# Patient Record
Sex: MALE | Race: WHITE | NOT HISPANIC OR LATINO | Employment: FULL TIME | URBAN - NONMETROPOLITAN AREA
[De-identification: names, ages, dates, MRNs, and addresses within clinical notes are randomized per-mention and may not be internally consistent; named-entity substitution may affect disease eponyms.]

---

## 2017-02-21 ENCOUNTER — OFFICE VISIT (OUTPATIENT)
Dept: GASTROENTEROLOGY | Facility: CLINIC | Age: 57
End: 2017-02-21

## 2017-02-21 VITALS
HEART RATE: 80 BPM | OXYGEN SATURATION: 92 % | DIASTOLIC BLOOD PRESSURE: 74 MMHG | WEIGHT: 294.6 LBS | BODY MASS INDEX: 49.08 KG/M2 | SYSTOLIC BLOOD PRESSURE: 118 MMHG | TEMPERATURE: 98 F | HEIGHT: 65 IN

## 2017-02-21 DIAGNOSIS — I10 ESSENTIAL HYPERTENSION: ICD-10-CM

## 2017-02-21 DIAGNOSIS — K63.5 COLON POLYPS: Primary | ICD-10-CM

## 2017-02-21 PROCEDURE — S0260 H&P FOR SURGERY: HCPCS | Performed by: NURSE PRACTITIONER

## 2017-02-21 RX ORDER — HYDROCHLOROTHIAZIDE 25 MG/1
TABLET ORAL
COMMUNITY
Start: 2017-01-06

## 2017-02-21 RX ORDER — LISINOPRIL 40 MG/1
TABLET ORAL
COMMUNITY
Start: 2017-02-16

## 2017-02-21 RX ORDER — CHLORAL HYDRATE 500 MG
1000 CAPSULE ORAL
COMMUNITY

## 2017-03-07 ENCOUNTER — ANESTHESIA EVENT (OUTPATIENT)
Dept: GASTROENTEROLOGY | Facility: HOSPITAL | Age: 57
End: 2017-03-07

## 2017-03-08 ENCOUNTER — ANESTHESIA (OUTPATIENT)
Dept: GASTROENTEROLOGY | Facility: HOSPITAL | Age: 57
End: 2017-03-08

## 2017-03-08 ENCOUNTER — HOSPITAL ENCOUNTER (OUTPATIENT)
Facility: HOSPITAL | Age: 57
Setting detail: HOSPITAL OUTPATIENT SURGERY
Discharge: HOME | End: 2017-03-08
Attending: INTERNAL MEDICINE | Admitting: INTERNAL MEDICINE

## 2017-03-08 VITALS
DIASTOLIC BLOOD PRESSURE: 72 MMHG | SYSTOLIC BLOOD PRESSURE: 110 MMHG | HEIGHT: 67 IN | BODY MASS INDEX: 44.57 KG/M2 | TEMPERATURE: 98.1 F | RESPIRATION RATE: 19 BRPM | WEIGHT: 284 LBS | HEART RATE: 82 BPM | OXYGEN SATURATION: 96 %

## 2017-03-08 PROCEDURE — 45378 DIAGNOSTIC COLONOSCOPY: CPT | Performed by: INTERNAL MEDICINE

## 2017-03-08 PROCEDURE — 25010000002 PROPOFOL 10 MG/ML EMULSION: Performed by: NURSE ANESTHETIST, CERTIFIED REGISTERED

## 2017-03-08 RX ORDER — SODIUM CHLORIDE 0.9 % (FLUSH) 0.9 %
1-10 SYRINGE (ML) INJECTION AS NEEDED
Status: DISCONTINUED | OUTPATIENT
Start: 2017-03-08 | End: 2017-03-08 | Stop reason: HOSPADM

## 2017-03-08 RX ORDER — SODIUM CHLORIDE 9 MG/ML
100 INJECTION, SOLUTION INTRAVENOUS CONTINUOUS
Status: DISCONTINUED | OUTPATIENT
Start: 2017-03-08 | End: 2017-03-08 | Stop reason: HOSPADM

## 2017-03-08 RX ORDER — PROPOFOL 10 MG/ML
VIAL (ML) INTRAVENOUS AS NEEDED
Status: DISCONTINUED | OUTPATIENT
Start: 2017-03-08 | End: 2017-03-08 | Stop reason: SURG

## 2017-03-08 RX ADMIN — PROPOFOL 290 MG: 10 INJECTION, EMULSION INTRAVENOUS at 08:52

## 2017-03-08 RX ADMIN — SODIUM CHLORIDE 100 ML/HR: 9 INJECTION, SOLUTION INTRAVENOUS at 08:10

## 2017-03-08 RX ADMIN — LIDOCAINE HYDROCHLORIDE 0.5 ML: 10 INJECTION, SOLUTION EPIDURAL; INFILTRATION; INTRACAUDAL; PERINEURAL at 08:09

## 2017-03-09 ENCOUNTER — TELEPHONE (OUTPATIENT)
Dept: GASTROENTEROLOGY | Facility: CLINIC | Age: 57
End: 2017-03-09

## 2017-06-30 RX ORDER — LISINOPRIL 40 MG/1
TABLET ORAL
COMMUNITY
Start: 2017-06-21 | End: 2017-07-17 | Stop reason: SDUPTHER

## 2017-06-30 RX ORDER — HYDROCHLOROTHIAZIDE 25 MG/1
TABLET ORAL
COMMUNITY
Start: 2017-06-21 | End: 2017-07-17 | Stop reason: SDUPTHER

## 2017-07-12 ENCOUNTER — HOSPITAL ENCOUNTER (OUTPATIENT)
Dept: CT IMAGING | Age: 57
Discharge: HOME | End: 2017-07-12
Payer: COMMERCIAL

## 2017-07-12 ENCOUNTER — OFFICE VISIT (OUTPATIENT)
Dept: INTERNAL MEDICINE | Age: 57
End: 2017-07-12
Payer: COMMERCIAL

## 2017-07-12 VITALS
TEMPERATURE: 98.5 F | HEIGHT: 67 IN | BODY MASS INDEX: 43.63 KG/M2 | WEIGHT: 278 LBS | OXYGEN SATURATION: 98 % | HEART RATE: 84 BPM | DIASTOLIC BLOOD PRESSURE: 78 MMHG | SYSTOLIC BLOOD PRESSURE: 132 MMHG

## 2017-07-12 DIAGNOSIS — K57.32 DIVERTICULITIS OF LARGE INTESTINE WITHOUT PERFORATION OR ABSCESS WITHOUT BLEEDING: Primary | ICD-10-CM

## 2017-07-12 DIAGNOSIS — R10.31 RIGHT LOWER QUADRANT ABDOMINAL PAIN: Primary | ICD-10-CM

## 2017-07-12 DIAGNOSIS — R10.31 RIGHT LOWER QUADRANT ABDOMINAL PAIN: ICD-10-CM

## 2017-07-12 DIAGNOSIS — R10.31 RLQ ABDOMINAL PAIN: ICD-10-CM

## 2017-07-12 DIAGNOSIS — K80.20 GALLSTONES: Primary | ICD-10-CM

## 2017-07-12 DIAGNOSIS — R10.31 RLQ ABDOMINAL PAIN: Primary | ICD-10-CM

## 2017-07-12 DIAGNOSIS — R10.9 ABDOMINAL CRAMPING: ICD-10-CM

## 2017-07-12 LAB
ALBUMIN SERPL-MCNC: 4.2 G/DL (ref 3.5–5.2)
ALP BLD-CCNC: 66 U/L (ref 40–130)
ALT SERPL-CCNC: 16 U/L (ref 5–41)
ANION GAP SERPL CALCULATED.3IONS-SCNC: 17 MMOL/L (ref 7–19)
AST SERPL-CCNC: 14 U/L (ref 5–40)
BILIRUB SERPL-MCNC: 0.9 MG/DL (ref 0.2–1.2)
BUN BLDV-MCNC: 14 MG/DL (ref 6–20)
C-REACTIVE PROTEIN: 5.5 MG/DL (ref 0–0.5)
CALCIUM SERPL-MCNC: 9.4 MG/DL (ref 8.6–10)
CHLORIDE BLD-SCNC: 94 MMOL/L (ref 98–111)
CO2: 28 MMOL/L (ref 22–29)
CREAT SERPL-MCNC: 0.8 MG/DL (ref 0.5–1.2)
GFR NON-AFRICAN AMERICAN: >60
GLUCOSE BLD-MCNC: 102 MG/DL (ref 74–109)
HCT VFR BLD CALC: 46.3 % (ref 42–52)
HEMOGLOBIN: 15.1 G/DL (ref 14–18)
LACTIC ACID: 1.3 MG/DL (ref 0.5–1.9)
MCH RBC QN AUTO: 27.3 PG (ref 27–31)
MCHC RBC AUTO-ENTMCNC: 32.6 G/DL (ref 33–37)
MCV RBC AUTO: 83.7 FL (ref 80–94)
PDW BLD-RTO: 13.8 % (ref 11.5–14.5)
PLATELET # BLD: 272 K/UL (ref 130–400)
PMV BLD AUTO: 9.4 FL (ref 9.4–12.4)
POTASSIUM SERPL-SCNC: 4.3 MMOL/L (ref 3.5–5)
RBC # BLD: 5.53 M/UL (ref 4.7–6.1)
SODIUM BLD-SCNC: 139 MMOL/L (ref 136–145)
TOTAL PROTEIN: 8 G/DL (ref 6.6–8.7)
WBC # BLD: 11.5 K/UL (ref 4.8–10.8)

## 2017-07-12 PROCEDURE — 74178 CT ABD&PLV WO CNTR FLWD CNTR: CPT

## 2017-07-12 PROCEDURE — 96372 THER/PROPH/DIAG INJ SC/IM: CPT | Performed by: NURSE PRACTITIONER

## 2017-07-12 PROCEDURE — 99214 OFFICE O/P EST MOD 30 MIN: CPT | Performed by: NURSE PRACTITIONER

## 2017-07-12 PROCEDURE — 6360000004 HC RX CONTRAST MEDICATION: Performed by: NURSE PRACTITIONER

## 2017-07-12 RX ORDER — CEFTRIAXONE 1 G/1
1 INJECTION, POWDER, FOR SOLUTION INTRAMUSCULAR; INTRAVENOUS ONCE
Status: COMPLETED | OUTPATIENT
Start: 2017-07-12 | End: 2017-07-12

## 2017-07-12 RX ORDER — METRONIDAZOLE 500 MG/1
500 TABLET ORAL 3 TIMES DAILY
Qty: 21 TABLET | Refills: 1 | Status: SHIPPED | OUTPATIENT
Start: 2017-07-12 | End: 2017-07-21 | Stop reason: ALTCHOICE

## 2017-07-12 RX ORDER — CHLORAL HYDRATE 500 MG
1000 CAPSULE ORAL
COMMUNITY

## 2017-07-12 RX ORDER — CEFDINIR 300 MG/1
300 CAPSULE ORAL 2 TIMES DAILY
Qty: 14 CAPSULE | Refills: 1 | Status: SHIPPED | OUTPATIENT
Start: 2017-07-12 | End: 2017-07-19

## 2017-07-12 RX ADMIN — IOVERSOL 75 ML: 741 INJECTION INTRA-ARTERIAL; INTRAVENOUS at 11:57

## 2017-07-12 RX ADMIN — CEFTRIAXONE 1 G: 1 INJECTION, POWDER, FOR SOLUTION INTRAMUSCULAR; INTRAVENOUS at 12:58

## 2017-07-12 ASSESSMENT — ENCOUNTER SYMPTOMS
CONSTIPATION: 0
WHEEZING: 0
BLOOD IN STOOL: 0
NAUSEA: 0
ABDOMINAL PAIN: 1
STRIDOR: 0
EYE DISCHARGE: 0
SHORTNESS OF BREATH: 0
CHOKING: 0
COLOR CHANGE: 0
TROUBLE SWALLOWING: 0
VOMITING: 0
DIARRHEA: 0
COUGH: 0
ABDOMINAL DISTENTION: 0
EYE ITCHING: 0
SORE THROAT: 0

## 2017-07-12 ASSESSMENT — PATIENT HEALTH QUESTIONNAIRE - PHQ9
1. LITTLE INTEREST OR PLEASURE IN DOING THINGS: 0
2. FEELING DOWN, DEPRESSED OR HOPELESS: 0
SUM OF ALL RESPONSES TO PHQ9 QUESTIONS 1 & 2: 0
SUM OF ALL RESPONSES TO PHQ QUESTIONS 1-9: 0

## 2017-07-17 RX ORDER — HYDROCHLOROTHIAZIDE 25 MG/1
25 TABLET ORAL DAILY
Qty: 30 TABLET | Refills: 5 | Status: SHIPPED | OUTPATIENT
Start: 2017-07-17 | End: 2018-07-23 | Stop reason: SDUPTHER

## 2017-07-17 RX ORDER — LISINOPRIL 40 MG/1
40 TABLET ORAL DAILY
Qty: 30 TABLET | Refills: 5 | Status: SHIPPED | OUTPATIENT
Start: 2017-07-17 | End: 2018-01-17 | Stop reason: SDUPTHER

## 2017-07-19 DIAGNOSIS — K57.32 DIVERTICULITIS OF LARGE INTESTINE WITHOUT PERFORATION OR ABSCESS WITHOUT BLEEDING: ICD-10-CM

## 2017-07-19 LAB
HCT VFR BLD CALC: 43.4 % (ref 42–52)
HEMOGLOBIN: 13.9 G/DL (ref 14–18)
MCH RBC QN AUTO: 26.7 PG (ref 27–31)
MCHC RBC AUTO-ENTMCNC: 32 G/DL (ref 33–37)
MCV RBC AUTO: 83.5 FL (ref 80–94)
PDW BLD-RTO: 13.7 % (ref 11.5–14.5)
PLATELET # BLD: 356 K/UL (ref 130–400)
PMV BLD AUTO: 9.5 FL (ref 9.4–12.4)
RBC # BLD: 5.2 M/UL (ref 4.7–6.1)
WBC # BLD: 10.6 K/UL (ref 4.8–10.8)

## 2017-07-21 ENCOUNTER — OFFICE VISIT (OUTPATIENT)
Dept: INTERNAL MEDICINE | Age: 57
End: 2017-07-21
Payer: COMMERCIAL

## 2017-07-21 VITALS
HEART RATE: 68 BPM | WEIGHT: 278 LBS | HEIGHT: 65 IN | DIASTOLIC BLOOD PRESSURE: 76 MMHG | BODY MASS INDEX: 46.32 KG/M2 | OXYGEN SATURATION: 96 % | SYSTOLIC BLOOD PRESSURE: 124 MMHG | TEMPERATURE: 97.7 F

## 2017-07-21 DIAGNOSIS — E78.2 MIXED HYPERLIPIDEMIA: Primary | ICD-10-CM

## 2017-07-21 DIAGNOSIS — Z00.00 HEALTHCARE MAINTENANCE: ICD-10-CM

## 2017-07-21 PROCEDURE — 99213 OFFICE O/P EST LOW 20 MIN: CPT | Performed by: NURSE PRACTITIONER

## 2017-07-21 ASSESSMENT — ENCOUNTER SYMPTOMS
STRIDOR: 0
EYE ITCHING: 0
CHOKING: 0
SHORTNESS OF BREATH: 0
CONSTIPATION: 0
TROUBLE SWALLOWING: 0
WHEEZING: 0
ABDOMINAL PAIN: 0
ABDOMINAL DISTENTION: 0
BLOOD IN STOOL: 0
COUGH: 0
COLOR CHANGE: 0
EYE DISCHARGE: 0
VOMITING: 0
SORE THROAT: 0
DIARRHEA: 0
NAUSEA: 0

## 2017-07-31 RX ORDER — TRIAMCINOLONE ACETONIDE 1 MG/G
CREAM TOPICAL
Qty: 1 TUBE | Refills: 5 | Status: SHIPPED | OUTPATIENT
Start: 2017-07-31 | End: 2019-03-04

## 2017-08-04 ENCOUNTER — TELEPHONE (OUTPATIENT)
Dept: INTERNAL MEDICINE | Age: 57
End: 2017-08-04

## 2017-08-04 RX ORDER — CEFDINIR 300 MG/1
300 CAPSULE ORAL 2 TIMES DAILY
Qty: 14 CAPSULE | Refills: 0 | Status: SHIPPED | OUTPATIENT
Start: 2017-08-04 | End: 2017-08-11

## 2018-01-17 ENCOUNTER — TELEPHONE (OUTPATIENT)
Dept: INTERNAL MEDICINE | Age: 58
End: 2018-01-17

## 2018-01-17 RX ORDER — LISINOPRIL 40 MG/1
40 TABLET ORAL DAILY
Qty: 30 TABLET | Refills: 5 | Status: SHIPPED | OUTPATIENT
Start: 2018-01-17 | End: 2018-07-23 | Stop reason: SDUPTHER

## 2018-02-16 ENCOUNTER — OFFICE VISIT (OUTPATIENT)
Dept: INTERNAL MEDICINE | Age: 58
End: 2018-02-16
Payer: COMMERCIAL

## 2018-02-16 VITALS
HEART RATE: 75 BPM | DIASTOLIC BLOOD PRESSURE: 82 MMHG | BODY MASS INDEX: 45.83 KG/M2 | SYSTOLIC BLOOD PRESSURE: 132 MMHG | OXYGEN SATURATION: 94 % | WEIGHT: 292 LBS | HEIGHT: 67 IN

## 2018-02-16 DIAGNOSIS — M25.50 ARTHRALGIA, UNSPECIFIED JOINT: Primary | ICD-10-CM

## 2018-02-16 DIAGNOSIS — I10 ESSENTIAL HYPERTENSION: ICD-10-CM

## 2018-02-16 DIAGNOSIS — M25.50 ARTHRALGIA, UNSPECIFIED JOINT: ICD-10-CM

## 2018-02-16 LAB
ALBUMIN SERPL-MCNC: 4 G/DL (ref 3.5–5.2)
ALP BLD-CCNC: 58 U/L (ref 40–130)
ALT SERPL-CCNC: 15 U/L (ref 5–41)
ANION GAP SERPL CALCULATED.3IONS-SCNC: 13 MMOL/L (ref 7–19)
AST SERPL-CCNC: 14 U/L (ref 5–40)
BILIRUB SERPL-MCNC: 0.6 MG/DL (ref 0.2–1.2)
BUN BLDV-MCNC: 18 MG/DL (ref 6–20)
C-REACTIVE PROTEIN: 0.99 MG/DL (ref 0–0.5)
CALCIUM SERPL-MCNC: 9.1 MG/DL (ref 8.6–10)
CHLORIDE BLD-SCNC: 97 MMOL/L (ref 98–111)
CO2: 29 MMOL/L (ref 22–29)
CREAT SERPL-MCNC: 0.9 MG/DL (ref 0.5–1.2)
GFR NON-AFRICAN AMERICAN: >60
GLUCOSE BLD-MCNC: 105 MG/DL (ref 74–109)
HCT VFR BLD CALC: 43.8 % (ref 42–52)
HEMOGLOBIN: 14.2 G/DL (ref 14–18)
MCH RBC QN AUTO: 27 PG (ref 27–31)
MCHC RBC AUTO-ENTMCNC: 32.4 G/DL (ref 33–37)
MCV RBC AUTO: 83.4 FL (ref 80–94)
PDW BLD-RTO: 13.4 % (ref 11.5–14.5)
PLATELET # BLD: 251 K/UL (ref 130–400)
PMV BLD AUTO: 9.2 FL (ref 9.4–12.4)
POTASSIUM SERPL-SCNC: 4.3 MMOL/L (ref 3.5–5)
RBC # BLD: 5.25 M/UL (ref 4.7–6.1)
RHEUMATOID FACTOR: <10 IU/ML
SEDIMENTATION RATE, ERYTHROCYTE: 8 MM/HR (ref 0–15)
SODIUM BLD-SCNC: 139 MMOL/L (ref 136–145)
TOTAL PROTEIN: 7.2 G/DL (ref 6.6–8.7)
URIC ACID, SERUM: 6.9 MG/DL (ref 3.4–7)
WBC # BLD: 5.4 K/UL (ref 4.8–10.8)

## 2018-02-16 PROCEDURE — 1036F TOBACCO NON-USER: CPT | Performed by: NURSE PRACTITIONER

## 2018-02-16 PROCEDURE — 3017F COLORECTAL CA SCREEN DOC REV: CPT | Performed by: NURSE PRACTITIONER

## 2018-02-16 PROCEDURE — G8484 FLU IMMUNIZE NO ADMIN: HCPCS | Performed by: NURSE PRACTITIONER

## 2018-02-16 PROCEDURE — 99213 OFFICE O/P EST LOW 20 MIN: CPT | Performed by: NURSE PRACTITIONER

## 2018-02-16 PROCEDURE — G8417 CALC BMI ABV UP PARAM F/U: HCPCS | Performed by: NURSE PRACTITIONER

## 2018-02-16 PROCEDURE — G8427 DOCREV CUR MEDS BY ELIG CLIN: HCPCS | Performed by: NURSE PRACTITIONER

## 2018-02-16 ASSESSMENT — ENCOUNTER SYMPTOMS
CHOKING: 0
ABDOMINAL PAIN: 0
EYE ITCHING: 0
EYE DISCHARGE: 0
COUGH: 0
SORE THROAT: 0
ABDOMINAL DISTENTION: 0
WHEEZING: 0
COLOR CHANGE: 0
BLOOD IN STOOL: 0
SHORTNESS OF BREATH: 0
CONSTIPATION: 0
VOMITING: 0
NAUSEA: 0
DIARRHEA: 0
STRIDOR: 0
TROUBLE SWALLOWING: 0

## 2018-02-16 NOTE — PATIENT INSTRUCTIONS
1  Arthralgia; We will get labs  Try a shot glass of black cherry juice daily  2.   HTN Stop the [facility] for now   We will monitor for elevation of bp and edema    I will call you first of the week with labs

## 2018-02-16 NOTE — PROGRESS NOTES
CAPS Take 1,000 mg by mouth      Sod Picosulfate-Mag Ox-Cit Acd 10-3.5-12 MG-GM-GM PACK Take as directed      CPAP Machine MISC by Does not apply route CPAP supplies 1 each 0     No current facility-administered medications for this visit. Allergies   Allergen Reactions    Aspirin Swelling    Nsaids Hives and Itching       Health Maintenance   Topic Date Due    Hepatitis C screen  1960    HIV screen  08/13/1975    DTaP/Tdap/Td vaccine (1 - Tdap) 08/13/1979    Lipid screen  08/13/2000    Diabetes screen  08/13/2000    Colon cancer screen colonoscopy  08/13/2010    Flu vaccine (1) 09/01/2017    Potassium monitoring  07/12/2018    Creatinine monitoring  07/12/2018       Subjective:      Review of Systems   Constitutional: Negative for fatigue, fever and unexpected weight change. HENT: Negative for ear discharge, ear pain, mouth sores, sore throat and trouble swallowing. Eyes: Negative for discharge, itching and visual disturbance. Respiratory: Negative for cough, choking, shortness of breath, wheezing and stridor. Cardiovascular: Negative for chest pain, palpitations and leg swelling. Gastrointestinal: Negative for abdominal distention, abdominal pain, blood in stool, constipation, diarrhea, nausea and vomiting. Endocrine: Negative for cold intolerance, polydipsia and polyuria. Genitourinary: Negative for difficulty urinating, dysuria, frequency and urgency. Musculoskeletal: Positive for arthralgias. Negative for gait problem. Skin: Negative for color change and rash. Allergic/Immunologic: Negative for food allergies and immunocompromised state. Neurological: Negative for dizziness, tremors, syncope, speech difficulty, weakness and headaches. Hematological: Negative for adenopathy. Does not bruise/bleed easily. Psychiatric/Behavioral: Negative for confusion and hallucinations.        Objective:     Physical Exam   Constitutional: He is oriented to person, place, and

## 2018-02-20 RX ORDER — CELECOXIB 100 MG/1
100 CAPSULE ORAL DAILY
Qty: 30 CAPSULE | Refills: 0 | Status: SHIPPED | OUTPATIENT
Start: 2018-02-20 | End: 2018-03-05 | Stop reason: DRUGHIGH

## 2018-03-05 RX ORDER — CELECOXIB 200 MG/1
200 CAPSULE ORAL DAILY
Qty: 30 CAPSULE | Refills: 1 | Status: SHIPPED | OUTPATIENT
Start: 2018-03-05 | End: 2018-05-07 | Stop reason: SDUPTHER

## 2018-03-05 RX ORDER — CELECOXIB 200 MG/1
200 CAPSULE ORAL DAILY
COMMUNITY
End: 2018-03-05 | Stop reason: SDUPTHER

## 2018-04-17 ENCOUNTER — OFFICE VISIT (OUTPATIENT)
Dept: INTERNAL MEDICINE | Age: 58
End: 2018-04-17
Payer: COMMERCIAL

## 2018-04-17 VITALS
TEMPERATURE: 99.4 F | HEART RATE: 85 BPM | BODY MASS INDEX: 45.04 KG/M2 | DIASTOLIC BLOOD PRESSURE: 80 MMHG | SYSTOLIC BLOOD PRESSURE: 110 MMHG | WEIGHT: 287 LBS | OXYGEN SATURATION: 94 % | RESPIRATION RATE: 16 BRPM | HEIGHT: 67 IN

## 2018-04-17 DIAGNOSIS — M54.50 ACUTE RIGHT-SIDED LOW BACK PAIN WITHOUT SCIATICA: ICD-10-CM

## 2018-04-17 DIAGNOSIS — M15.9 PRIMARY OSTEOARTHRITIS INVOLVING MULTIPLE JOINTS: ICD-10-CM

## 2018-04-17 DIAGNOSIS — J20.9 ACUTE BRONCHITIS, UNSPECIFIED ORGANISM: Primary | ICD-10-CM

## 2018-04-17 DIAGNOSIS — J01.40 ACUTE NON-RECURRENT PANSINUSITIS: ICD-10-CM

## 2018-04-17 PROCEDURE — 3017F COLORECTAL CA SCREEN DOC REV: CPT | Performed by: NURSE PRACTITIONER

## 2018-04-17 PROCEDURE — G8417 CALC BMI ABV UP PARAM F/U: HCPCS | Performed by: NURSE PRACTITIONER

## 2018-04-17 PROCEDURE — 96372 THER/PROPH/DIAG INJ SC/IM: CPT | Performed by: NURSE PRACTITIONER

## 2018-04-17 PROCEDURE — 1036F TOBACCO NON-USER: CPT | Performed by: NURSE PRACTITIONER

## 2018-04-17 PROCEDURE — G8427 DOCREV CUR MEDS BY ELIG CLIN: HCPCS | Performed by: NURSE PRACTITIONER

## 2018-04-17 PROCEDURE — 99214 OFFICE O/P EST MOD 30 MIN: CPT | Performed by: NURSE PRACTITIONER

## 2018-04-17 RX ORDER — METHYLPREDNISOLONE ACETATE 80 MG/ML
80 INJECTION, SUSPENSION INTRA-ARTICULAR; INTRALESIONAL; INTRAMUSCULAR; SOFT TISSUE ONCE
Status: COMPLETED | OUTPATIENT
Start: 2018-04-17 | End: 2018-04-17

## 2018-04-17 RX ORDER — CEFDINIR 300 MG/1
300 CAPSULE ORAL 2 TIMES DAILY
Qty: 14 CAPSULE | Refills: 0 | Status: SHIPPED | OUTPATIENT
Start: 2018-04-17 | End: 2018-04-24

## 2018-04-17 RX ORDER — CYCLOBENZAPRINE HCL 10 MG
10 TABLET ORAL 3 TIMES DAILY PRN
Qty: 30 TABLET | Refills: 1 | Status: SHIPPED | OUTPATIENT
Start: 2018-04-17 | End: 2018-04-27

## 2018-04-17 RX ADMIN — METHYLPREDNISOLONE ACETATE 80 MG: 80 INJECTION, SUSPENSION INTRA-ARTICULAR; INTRALESIONAL; INTRAMUSCULAR; SOFT TISSUE at 13:46

## 2018-04-17 ASSESSMENT — ENCOUNTER SYMPTOMS
NAUSEA: 0
SINUS PRESSURE: 1
BACK PAIN: 1
ABDOMINAL DISTENTION: 0
VOMITING: 0
BLOOD IN STOOL: 0
COLOR CHANGE: 0
SHORTNESS OF BREATH: 0
STRIDOR: 0
ABDOMINAL PAIN: 0
TROUBLE SWALLOWING: 0
COUGH: 1
EYE ITCHING: 0
CHOKING: 0
CONSTIPATION: 0
SORE THROAT: 1
DIARRHEA: 0
EYE DISCHARGE: 0
WHEEZING: 0

## 2018-04-23 ENCOUNTER — TELEPHONE (OUTPATIENT)
Dept: INTERNAL MEDICINE | Age: 58
End: 2018-04-23

## 2018-05-07 RX ORDER — CELECOXIB 200 MG/1
200 CAPSULE ORAL DAILY
Qty: 30 CAPSULE | Refills: 3 | Status: SHIPPED | OUTPATIENT
Start: 2018-05-07 | End: 2018-06-20

## 2018-06-05 ENCOUNTER — TELEPHONE (OUTPATIENT)
Dept: INTERNAL MEDICINE | Age: 58
End: 2018-06-05

## 2018-06-05 DIAGNOSIS — M79.605 PAIN OF LEFT LOWER EXTREMITY: Primary | ICD-10-CM

## 2018-06-20 ENCOUNTER — HOSPITAL ENCOUNTER (OUTPATIENT)
Dept: NON INVASIVE DIAGNOSTICS | Age: 58
Discharge: HOME | End: 2018-06-20
Payer: COMMERCIAL

## 2018-06-20 ENCOUNTER — OFFICE VISIT (OUTPATIENT)
Dept: INTERNAL MEDICINE | Age: 58
End: 2018-06-20
Payer: COMMERCIAL

## 2018-06-20 VITALS
SYSTOLIC BLOOD PRESSURE: 110 MMHG | RESPIRATION RATE: 16 BRPM | HEART RATE: 87 BPM | HEIGHT: 67 IN | DIASTOLIC BLOOD PRESSURE: 68 MMHG | OXYGEN SATURATION: 94 % | BODY MASS INDEX: 45.99 KG/M2 | WEIGHT: 293 LBS

## 2018-06-20 DIAGNOSIS — M79.605 PAIN OF LEFT LOWER EXTREMITY: ICD-10-CM

## 2018-06-20 DIAGNOSIS — M25.561 CHRONIC PAIN OF RIGHT KNEE: Primary | ICD-10-CM

## 2018-06-20 DIAGNOSIS — M15.9 PRIMARY OSTEOARTHRITIS INVOLVING MULTIPLE JOINTS: ICD-10-CM

## 2018-06-20 DIAGNOSIS — G89.29 CHRONIC PAIN OF RIGHT KNEE: Primary | ICD-10-CM

## 2018-06-20 PROCEDURE — G8427 DOCREV CUR MEDS BY ELIG CLIN: HCPCS | Performed by: NURSE PRACTITIONER

## 2018-06-20 PROCEDURE — 99213 OFFICE O/P EST LOW 20 MIN: CPT | Performed by: NURSE PRACTITIONER

## 2018-06-20 PROCEDURE — G8417 CALC BMI ABV UP PARAM F/U: HCPCS | Performed by: NURSE PRACTITIONER

## 2018-06-20 PROCEDURE — 93923 UPR/LXTR ART STDY 3+ LVLS: CPT

## 2018-06-20 PROCEDURE — 3017F COLORECTAL CA SCREEN DOC REV: CPT | Performed by: NURSE PRACTITIONER

## 2018-06-20 PROCEDURE — 1036F TOBACCO NON-USER: CPT | Performed by: NURSE PRACTITIONER

## 2018-06-20 ASSESSMENT — ENCOUNTER SYMPTOMS
COLOR CHANGE: 0
STRIDOR: 0
CONSTIPATION: 0
VOMITING: 0
NAUSEA: 0
EYE DISCHARGE: 0
EYE ITCHING: 0
TROUBLE SWALLOWING: 0
SHORTNESS OF BREATH: 0
WHEEZING: 0
DIARRHEA: 0
ABDOMINAL DISTENTION: 0
BLOOD IN STOOL: 0
ABDOMINAL PAIN: 0
CHOKING: 0
SORE THROAT: 0
COUGH: 0

## 2018-07-12 ENCOUNTER — TELEPHONE (OUTPATIENT)
Dept: VASCULAR SURGERY | Age: 58
End: 2018-07-12

## 2018-07-12 NOTE — TELEPHONE ENCOUNTER
I called to r/s a cancelled appt and the pt said it didn't need to be scheduled. That the results showed nothing wrong.  Francois Chance

## 2018-07-23 RX ORDER — LISINOPRIL 40 MG/1
40 TABLET ORAL DAILY
Qty: 30 TABLET | Refills: 5 | Status: SHIPPED | OUTPATIENT
Start: 2018-07-23 | End: 2019-01-21 | Stop reason: SDUPTHER

## 2018-07-23 RX ORDER — HYDROCHLOROTHIAZIDE 25 MG/1
25 TABLET ORAL DAILY
Qty: 30 TABLET | Refills: 5 | Status: SHIPPED | OUTPATIENT
Start: 2018-07-23 | End: 2019-07-09 | Stop reason: SDUPTHER

## 2018-10-05 ENCOUNTER — NURSE ONLY (OUTPATIENT)
Dept: INTERNAL MEDICINE | Age: 58
End: 2018-10-05
Payer: COMMERCIAL

## 2018-10-05 DIAGNOSIS — Z23 NEED FOR INFLUENZA VACCINATION: Primary | ICD-10-CM

## 2018-10-05 PROCEDURE — 90686 IIV4 VACC NO PRSV 0.5 ML IM: CPT | Performed by: NURSE PRACTITIONER

## 2018-10-05 PROCEDURE — 90471 IMMUNIZATION ADMIN: CPT | Performed by: NURSE PRACTITIONER

## 2019-01-21 RX ORDER — LISINOPRIL 40 MG/1
TABLET ORAL
Qty: 30 TABLET | Refills: 5 | Status: SHIPPED | OUTPATIENT
Start: 2019-01-21 | End: 2019-07-09 | Stop reason: SDUPTHER

## 2019-03-04 ENCOUNTER — OFFICE VISIT (OUTPATIENT)
Dept: INTERNAL MEDICINE | Age: 59
End: 2019-03-04
Payer: COMMERCIAL

## 2019-03-04 VITALS
HEIGHT: 66 IN | RESPIRATION RATE: 20 BRPM | WEIGHT: 304 LBS | SYSTOLIC BLOOD PRESSURE: 104 MMHG | DIASTOLIC BLOOD PRESSURE: 66 MMHG | OXYGEN SATURATION: 95 % | BODY MASS INDEX: 48.86 KG/M2 | HEART RATE: 93 BPM

## 2019-03-04 DIAGNOSIS — R30.0 DYSURIA: ICD-10-CM

## 2019-03-04 DIAGNOSIS — R50.9 FEVER, UNSPECIFIED FEVER CAUSE: Primary | ICD-10-CM

## 2019-03-04 DIAGNOSIS — N41.0 ACUTE PROSTATITIS: Primary | ICD-10-CM

## 2019-03-04 LAB
ALBUMIN SERPL-MCNC: 4 G/DL (ref 3.5–5.2)
ALP BLD-CCNC: 64 U/L (ref 40–130)
ALT SERPL-CCNC: 20 U/L (ref 5–41)
ANION GAP SERPL CALCULATED.3IONS-SCNC: 18 MMOL/L (ref 7–19)
AST SERPL-CCNC: 17 U/L (ref 5–40)
BACTERIA: ABNORMAL /HPF
BASOPHILS ABSOLUTE: 0.1 K/UL (ref 0–0.2)
BASOPHILS RELATIVE PERCENT: 0.3 % (ref 0–1)
BILIRUB SERPL-MCNC: 0.8 MG/DL (ref 0.2–1.2)
BILIRUBIN URINE: NEGATIVE
BLOOD, URINE: ABNORMAL
BUN BLDV-MCNC: 19 MG/DL (ref 6–20)
CALCIUM SERPL-MCNC: 9 MG/DL (ref 8.6–10)
CHLORIDE BLD-SCNC: 95 MMOL/L (ref 98–111)
CLARITY: ABNORMAL
CO2: 27 MMOL/L (ref 22–29)
COLOR: ABNORMAL
CREAT SERPL-MCNC: 1.2 MG/DL (ref 0.5–1.2)
EOSINOPHILS ABSOLUTE: 0.1 K/UL (ref 0–0.6)
EOSINOPHILS RELATIVE PERCENT: 0.4 % (ref 0–5)
EPITHELIAL CELLS, UA: ABNORMAL /HPF
GFR NON-AFRICAN AMERICAN: >60
GLUCOSE BLD-MCNC: 100 MG/DL (ref 74–109)
GLUCOSE URINE: NEGATIVE MG/DL
HCT VFR BLD CALC: 42.7 % (ref 42–52)
HEMOGLOBIN: 13.5 G/DL (ref 14–18)
KETONES, URINE: 40 MG/DL
LEUKOCYTE ESTERASE, URINE: ABNORMAL
LYMPHOCYTES ABSOLUTE: 1.2 K/UL (ref 1.1–4.5)
LYMPHOCYTES RELATIVE PERCENT: 6.8 % (ref 20–40)
MCH RBC QN AUTO: 26.6 PG (ref 27–31)
MCHC RBC AUTO-ENTMCNC: 31.6 G/DL (ref 33–37)
MCV RBC AUTO: 84.1 FL (ref 80–94)
MONOCYTES ABSOLUTE: 1.3 K/UL (ref 0–0.9)
MONOCYTES RELATIVE PERCENT: 7.3 % (ref 0–10)
NEUTROPHILS ABSOLUTE: 15.3 K/UL (ref 1.5–7.5)
NEUTROPHILS RELATIVE PERCENT: 84.5 % (ref 50–65)
NITRITE, URINE: NEGATIVE
PDW BLD-RTO: 13.8 % (ref 11.5–14.5)
PH UA: 5.5 (ref 5–8)
PLATELET # BLD: 217 K/UL (ref 130–400)
PMV BLD AUTO: 9.6 FL (ref 9.4–12.4)
POTASSIUM SERPL-SCNC: 3.8 MMOL/L (ref 3.5–5)
PROTEIN UA: ABNORMAL MG/DL
RBC # BLD: 5.08 M/UL (ref 4.7–6.1)
RBC UA: ABNORMAL /HPF (ref 0–2)
SODIUM BLD-SCNC: 140 MMOL/L (ref 136–145)
SPECIFIC GRAVITY UA: 1.02 (ref 1–1.03)
TOTAL PROTEIN: 7.4 G/DL (ref 6.6–8.7)
URINE REFLEX TO CULTURE: YES
UROBILINOGEN, URINE: 0.2 E.U./DL
WBC # BLD: 18 K/UL (ref 4.8–10.8)
WBC UA: ABNORMAL /HPF (ref 0–5)

## 2019-03-04 PROCEDURE — G8427 DOCREV CUR MEDS BY ELIG CLIN: HCPCS | Performed by: NURSE PRACTITIONER

## 2019-03-04 PROCEDURE — 1036F TOBACCO NON-USER: CPT | Performed by: NURSE PRACTITIONER

## 2019-03-04 PROCEDURE — G8417 CALC BMI ABV UP PARAM F/U: HCPCS | Performed by: NURSE PRACTITIONER

## 2019-03-04 PROCEDURE — 3017F COLORECTAL CA SCREEN DOC REV: CPT | Performed by: NURSE PRACTITIONER

## 2019-03-04 PROCEDURE — 99214 OFFICE O/P EST MOD 30 MIN: CPT | Performed by: NURSE PRACTITIONER

## 2019-03-04 PROCEDURE — G8482 FLU IMMUNIZE ORDER/ADMIN: HCPCS | Performed by: NURSE PRACTITIONER

## 2019-03-04 RX ORDER — CIPROFLOXACIN 500 MG/1
500 TABLET, FILM COATED ORAL 2 TIMES DAILY
Qty: 28 TABLET | Refills: 0 | Status: SHIPPED | OUTPATIENT
Start: 2019-03-04 | End: 2019-03-18

## 2019-03-04 ASSESSMENT — ENCOUNTER SYMPTOMS
COLOR CHANGE: 0
SORE THROAT: 0
VOMITING: 0
SHORTNESS OF BREATH: 0
CHOKING: 0
WHEEZING: 0
ABDOMINAL PAIN: 0
DIARRHEA: 0
STRIDOR: 0
ABDOMINAL DISTENTION: 0
NAUSEA: 0
BLOOD IN STOOL: 0
EYE DISCHARGE: 0
TROUBLE SWALLOWING: 0
COUGH: 0
CONSTIPATION: 0
EYE ITCHING: 0

## 2019-03-04 ASSESSMENT — PATIENT HEALTH QUESTIONNAIRE - PHQ9
SUM OF ALL RESPONSES TO PHQ QUESTIONS 1-9: 0
2. FEELING DOWN, DEPRESSED OR HOPELESS: 0
SUM OF ALL RESPONSES TO PHQ QUESTIONS 1-9: 0
SUM OF ALL RESPONSES TO PHQ9 QUESTIONS 1 & 2: 0
1. LITTLE INTEREST OR PLEASURE IN DOING THINGS: 0

## 2019-03-06 LAB — URINE CULTURE, ROUTINE: NORMAL

## 2019-06-12 ENCOUNTER — OFFICE VISIT (OUTPATIENT)
Dept: INTERNAL MEDICINE | Age: 59
End: 2019-06-12
Payer: COMMERCIAL

## 2019-06-12 ENCOUNTER — HOSPITAL ENCOUNTER (OUTPATIENT)
Dept: GENERAL RADIOLOGY | Age: 59
Discharge: HOME | End: 2019-06-12
Payer: COMMERCIAL

## 2019-06-12 VITALS
BODY MASS INDEX: 47.73 KG/M2 | OXYGEN SATURATION: 94 % | DIASTOLIC BLOOD PRESSURE: 68 MMHG | WEIGHT: 297 LBS | TEMPERATURE: 100.1 F | RESPIRATION RATE: 18 BRPM | HEART RATE: 95 BPM | SYSTOLIC BLOOD PRESSURE: 118 MMHG | HEIGHT: 66 IN

## 2019-06-12 DIAGNOSIS — R79.89 ELEVATED LFTS: ICD-10-CM

## 2019-06-12 DIAGNOSIS — R31.21 ASYMPTOMATIC MICROSCOPIC HEMATURIA: ICD-10-CM

## 2019-06-12 DIAGNOSIS — R50.9 FEVER, UNSPECIFIED FEVER CAUSE: ICD-10-CM

## 2019-06-12 DIAGNOSIS — R50.9 FEVER, UNSPECIFIED FEVER CAUSE: Primary | ICD-10-CM

## 2019-06-12 LAB
ALBUMIN SERPL-MCNC: 4.3 G/DL (ref 3.5–5.2)
ALP BLD-CCNC: 63 U/L (ref 40–130)
ALT SERPL-CCNC: 48 U/L (ref 5–41)
ANION GAP SERPL CALCULATED.3IONS-SCNC: 18 MMOL/L (ref 7–19)
AST SERPL-CCNC: 45 U/L (ref 5–40)
BACTERIA: NEGATIVE /HPF
BASOPHILS ABSOLUTE: 0 K/UL (ref 0–0.2)
BASOPHILS RELATIVE PERCENT: 0.3 % (ref 0–1)
BILIRUB SERPL-MCNC: 0.8 MG/DL (ref 0.2–1.2)
BILIRUBIN URINE: ABNORMAL
BLOOD, URINE: ABNORMAL
BUN BLDV-MCNC: 18 MG/DL (ref 6–20)
CALCIUM SERPL-MCNC: 9.2 MG/DL (ref 8.6–10)
CHLORIDE BLD-SCNC: 91 MMOL/L (ref 98–111)
CLARITY: CLEAR
CO2: 26 MMOL/L (ref 22–29)
COLOR: ABNORMAL
CREAT SERPL-MCNC: 1 MG/DL (ref 0.5–1.2)
EOSINOPHILS ABSOLUTE: 0 K/UL (ref 0–0.6)
EOSINOPHILS RELATIVE PERCENT: 0 % (ref 0–5)
EPITHELIAL CELLS, UA: 2 /HPF (ref 0–5)
GFR NON-AFRICAN AMERICAN: >60
GLUCOSE BLD-MCNC: 100 MG/DL (ref 74–109)
GLUCOSE URINE: NEGATIVE MG/DL
HCT VFR BLD CALC: 44.9 % (ref 42–52)
HEMOGLOBIN: 14.7 G/DL (ref 14–18)
HYALINE CASTS: 3 /HPF (ref 0–8)
KETONES, URINE: 40 MG/DL
LEUKOCYTE ESTERASE, URINE: NEGATIVE
LYMPHOCYTES ABSOLUTE: 0.5 K/UL (ref 1.1–4.5)
LYMPHOCYTES RELATIVE PERCENT: 7.7 % (ref 20–40)
MCH RBC QN AUTO: 27.1 PG (ref 27–31)
MCHC RBC AUTO-ENTMCNC: 32.7 G/DL (ref 33–37)
MCV RBC AUTO: 82.8 FL (ref 80–94)
MONOCYTES ABSOLUTE: 0.3 K/UL (ref 0–0.9)
MONOCYTES RELATIVE PERCENT: 5.7 % (ref 0–10)
NEUTROPHILS ABSOLUTE: 5.1 K/UL (ref 1.5–7.5)
NEUTROPHILS RELATIVE PERCENT: 85.8 % (ref 50–65)
NITRITE, URINE: NEGATIVE
PDW BLD-RTO: 14.2 % (ref 11.5–14.5)
PH UA: 6 (ref 5–8)
PLATELET # BLD: 186 K/UL (ref 130–400)
PMV BLD AUTO: 9.7 FL (ref 9.4–12.4)
POTASSIUM SERPL-SCNC: 3.9 MMOL/L (ref 3.5–5)
PROSTATE SPECIFIC ANTIGEN: 1.05 NG/ML (ref 0–4)
PROTEIN UA: ABNORMAL MG/DL
RBC # BLD: 5.42 M/UL (ref 4.7–6.1)
RBC UA: 5 /HPF (ref 0–4)
SODIUM BLD-SCNC: 135 MMOL/L (ref 136–145)
SPECIFIC GRAVITY UA: 1.02 (ref 1–1.03)
TOTAL PROTEIN: 7.6 G/DL (ref 6.6–8.7)
URINE REFLEX TO CULTURE: ABNORMAL
UROBILINOGEN, URINE: 1 E.U./DL
WBC # BLD: 6 K/UL (ref 4.8–10.8)
WBC UA: 2 /HPF (ref 0–5)

## 2019-06-12 PROCEDURE — 71046 X-RAY EXAM CHEST 2 VIEWS: CPT

## 2019-06-12 PROCEDURE — 99358 PROLONG SERVICE W/O CONTACT: CPT | Performed by: NURSE PRACTITIONER

## 2019-06-12 PROCEDURE — 1036F TOBACCO NON-USER: CPT | Performed by: NURSE PRACTITIONER

## 2019-06-12 PROCEDURE — 3017F COLORECTAL CA SCREEN DOC REV: CPT | Performed by: NURSE PRACTITIONER

## 2019-06-12 PROCEDURE — G8417 CALC BMI ABV UP PARAM F/U: HCPCS | Performed by: NURSE PRACTITIONER

## 2019-06-12 PROCEDURE — 96374 THER/PROPH/DIAG INJ IV PUSH: CPT | Performed by: NURSE PRACTITIONER

## 2019-06-12 PROCEDURE — 99214 OFFICE O/P EST MOD 30 MIN: CPT | Performed by: NURSE PRACTITIONER

## 2019-06-12 PROCEDURE — 70210 X-RAY EXAM OF SINUSES: CPT

## 2019-06-12 PROCEDURE — G8427 DOCREV CUR MEDS BY ELIG CLIN: HCPCS | Performed by: NURSE PRACTITIONER

## 2019-06-12 RX ORDER — CEFDINIR 300 MG/1
300 CAPSULE ORAL 2 TIMES DAILY
Qty: 14 CAPSULE | Refills: 0 | Status: SHIPPED | OUTPATIENT
Start: 2019-06-12 | End: 2019-06-19

## 2019-06-12 RX ORDER — PROMETHAZINE HYDROCHLORIDE 25 MG/1
25 TABLET ORAL EVERY 6 HOURS PRN
Qty: 28 TABLET | Refills: 2 | Status: SHIPPED | OUTPATIENT
Start: 2019-06-12 | End: 2019-06-19

## 2019-06-12 ASSESSMENT — ENCOUNTER SYMPTOMS
STRIDOR: 0
BACK PAIN: 1
WHEEZING: 0
TROUBLE SWALLOWING: 0
SORE THROAT: 0
DIARRHEA: 0
VOMITING: 0
EYE DISCHARGE: 0
CHOKING: 0
BLOOD IN STOOL: 0
ABDOMINAL PAIN: 0
EYE ITCHING: 0
NAUSEA: 0
SHORTNESS OF BREATH: 0
COUGH: 1
CONSTIPATION: 0
COLOR CHANGE: 0
ABDOMINAL DISTENTION: 0

## 2019-06-12 NOTE — PROGRESS NOTES
[facility]  [address] [address]  Dept: [phone]  Dept Fax: [phone]  Loc: [phone]    Chente Winters is a 62 y.o. male who presents today for his medical conditions/complaints as noted below. Chente Winters is c/opal Fever (Patient states fever, cough, and congestion x 4 days.) and Lower Back Pain (Patient states increased back pain from coughing.)        HPI:     HPI   1. Fever up to 104; Monday started low grade up to 101 then all evening 104; He had not taken any tylenol  he has low back pain ; he denies uti symptoms; He does have a history of prostatitis    He has been coughing   This am 100.4  Has had a headache and cant get comfortable  Due to his back. 2.  Headache  For several days; It is frontal and constant;    3. Nausea that is relieved with phenergan            Chief Complaint   Patient presents with    Fever     Patient states fever, cough, and congestion x 4 days.  Lower Back Pain     Patient states increased back pain from coughing. Past Medical History:   Diagnosis Date    Hypertension       History reviewed. No pertinent surgical history.     Vitals 6/12/2019 3/4/2019 6/20/2018 4/17/2018 2/16/2018 2/34/5249   SYSTOLIC 340 [phone]   DIASTOLIC 68 66 68 80 82 76   Pulse 95 93 87 85 75 68   Temp 100.1 - - 99.4 - 97.7   Resp 18 20 16 16 - -   SpO2 94 95 94 94 94 96   Weight 297 lb 304 lb 293 lb 287 lb 292 lb 278 lb   Height 5' 6\" 5' 6\" 5' 7\" 5' 7\" 5' 7\" 5' 5\"   BMI (wt*703/ht~2) 47.93 kg/m2 49.06 kg/m2 45.89 kg/m2 44.95 kg/m2 45.73 kg/m2 46.26 kg/m2       Family History   Adopted: Yes       Social History     Tobacco Use    Smoking status: Never Smoker    Smokeless tobacco: Never Used   Substance Use Topics    Alcohol use: No      Current Outpatient Medications   Medication Sig Dispense Refill    promethazine (PHENERGAN) 25 MG tablet Take 1 tablet by mouth every 6 hours as needed for Nausea 28 tablet 2    lisinopril (PRINIVIL;ZESTRIL) 40 MG tablet TAKE ONE TABLET BY MOUTH EVERY DAY 30 tablet 5    hydrochlorothiazide (HYDRODIURIL) 25 MG tablet Take 1 tablet by mouth daily 30 tablet 5    Omega-3 Fatty Acids (FISH OIL) 1000 MG CAPS Take 1,000 mg by mouth      CPAP Machine MISC by Does not apply route CPAP supplies 1 each 0     No current facility-administered medications for this visit.       Allergies   Allergen Reactions    Aspirin Swelling    Nsaids Hives and Itching       Health Maintenance   Topic Date Due    Hepatitis C screen  1960    HIV screen  08/13/1975    DTaP/Tdap/Td vaccine (1 - Tdap) 08/13/1979    Lipid screen  08/13/2000    Shingles Vaccine (1 of 2) 08/13/2010    Colon cancer screen colonoscopy  08/13/2010    Potassium monitoring  03/04/2020    Creatinine monitoring  03/04/2020    Flu vaccine  Completed    Pneumococcal 0-64 years Vaccine  Aged Out       No results found for: LABA1C  No results found for: PSA, PSADIA  No results found for: TSH]  Lab Results   Component Value Date     (L) 06/12/2019    K 3.9 06/12/2019    CL 91 (L) 06/12/2019    CO2 26 06/12/2019    BUN 18 06/12/2019    CREATININE 1.0 06/12/2019    GLUCOSE 100 06/12/2019    CALCIUM 9.2 06/12/2019    PROT 7.6 06/12/2019    LABALBU 4.3 06/12/2019    BILITOT 0.8 06/12/2019    ALKPHOS 63 06/12/2019    AST 45 (H) 06/12/2019    ALT 48 (H) 06/12/2019    LABGLOM >60 06/12/2019     No results found for: CHOL  No results found for: TRIG  No results found for: HDL  No results found for: Mireya Radha [address]  Lab Results   Component Value Date     06/12/2019    K 3.9 06/12/2019    CL 91 06/12/2019    CO2 26 06/12/2019    BUN 18 06/12/2019    CREATININE 1.0 06/12/2019    GLUCOSE 100 06/12/2019    CALCIUM 9.2 06/12/2019      Lab Results   Component Value Date    WBC 6.0 06/12/2019    HGB 14.7 06/12/2019    HCT 44.9 06/12/2019    MCV 82.8 06/12/2019     06/12/2019    LYMPHOPCT 7.7 (L) 06/12/2019    RBC 5.42 06/12/2019    MCH 27.1 06/12/2019    MCHC 32.7 (L) 06/12/2019    RDW 14.2 06/12/2019     No results found for: VITD25    Subjective:      Review of Systems   Constitutional: Positive for fever. Negative for fatigue and unexpected weight change. HENT: Negative for ear discharge, ear pain, mouth sores, sore throat and trouble swallowing. Eyes: Negative for discharge, itching and visual disturbance. Respiratory: Positive for cough. Negative for choking, shortness of breath, wheezing and stridor. Cardiovascular: Negative for chest pain, palpitations and leg swelling. Gastrointestinal: Negative for abdominal distention, abdominal pain, blood in stool, constipation, diarrhea, nausea and vomiting. Endocrine: Negative for cold intolerance, polydipsia and polyuria. Genitourinary: Negative for difficulty urinating, dysuria, frequency and urgency. Musculoskeletal: Positive for back pain. Negative for arthralgias and gait problem. Skin: Negative for color change and rash. Allergic/Immunologic: Negative for food allergies and immunocompromised state. Neurological: Negative for dizziness, tremors, syncope, speech difficulty, weakness and headaches. Hematological: Negative for adenopathy. Does not bruise/bleed easily. Psychiatric/Behavioral: Negative for confusion and hallucinations. Objective:     Physical Exam   Constitutional: He is oriented to person, place, and time. He appears well-developed and well-nourished. No distress. HENT:   Head: Normocephalic and atraumatic. Eyes: Pupils are equal, round, and reactive to light. Right eye exhibits no discharge. Left eye exhibits no discharge. No scleral icterus. Neck: Normal range of motion. Neck supple. No JVD present. No thyromegaly present. Cardiovascular: Normal rate, regular rhythm and normal heart sounds. No murmur heard. Pulmonary/Chest: Effort normal and breath sounds normal. No respiratory distress. He has no wheezes. He has no rales. Abdominal: Soft. Bowel sounds are normal. He exhibits no distension and no mass. There is no tenderness. There is no rebound and no guarding. Musculoskeletal: Normal range of motion. He exhibits no edema or tenderness. Lymphadenopathy:        Head (right side): Submandibular adenopathy present. Head (left side): Submandibular adenopathy present. He has axillary adenopathy. Neurological: He is alert and oriented to person, place, and time. He has normal reflexes. He displays normal reflexes. No cranial nerve deficit. Coordination normal.   Skin: Skin is warm and dry. No rash noted. No erythema. Psychiatric: His behavior is normal. Judgment and thought content normal. He does not exhibit a depressed mood. PROCEDURE:  0900  IID with #20 cath left hand;  250 CC NS with I Gm rocephin IVPB; Awaiting labs and xrays   1000  250 infused;    1000cc fluid started  Still awaiting labs; '  Labs reviewed with him and his wife;    IID d/c with 700 cc fluid infused;                        /68   Pulse 95   Temp 100.1 °F (37.8 °C)   Resp 18   Ht 5' 6\" (1.676 m)   Wt 297 lb (134.7 kg)   SpO2 94%   BMI 47.94 kg/m²     Assessment:       Diagnosis Orders   1. Fever, unspecified fever cause  CBC Auto Differential    Comprehensive Metabolic Panel    Urinalysis Reflex to Culture    Culture Blood #1    Culture Blood #2    XR CHEST STANDARD (2 VW)    XR SINUSES (<3 VIEWS)    Hepatitis Panel, Acute   2. Asymptomatic microscopic hematuria  PSA   3. Elevated LFTs  CT ABDOMEN PELVIS WO CONTRAST Additional Contrast? None    Hepatitis Panel, Acute     Labs reviewedfrom today   Diagnostics reviewed from today   Plan:        Patient given educational materials - see patient instructions. Discussed use, benefit, and side effects of prescribed medications. Allpatient questions answered. Pt voiced understanding. Reviewed health maintenance.   Instructed to continue current medications, diet and exercise. Patient agreed with treatment plan. Follow up as directed. MEDICATIONS:  Orders Placed This Encounter   Medications    promethazine (PHENERGAN) 25 MG tablet     Sig: Take 1 tablet by mouth every 6 hours as needed for Nausea     Dispense:  28 tablet     Refill:  2         ORDERS:  Orders Placed This Encounter   Procedures    Culture Blood #1    Culture Blood #2    XR CHEST STANDARD (2 VW)    XR SINUSES (<3 VIEWS)    CT ABDOMEN PELVIS WO CONTRAST Additional Contrast? None    CBC Auto Differential    Comprehensive Metabolic Panel    Urinalysis Reflex to Culture    PSA    Hepatitis Panel, Acute       Follow-up:  No follow-ups on file. PATIENT INSTRUCTIONS:  Patient Instructions   1. Fever ;   Labs and ct head   2. Back pain   Labs   FUO  With hematuria; Rocephin IV in office with 1000cc IVF's    Labs with blood in urine we will add PSA; Electronically signed by SHARON García on 6/12/2019 at 12:16 PM    EMRDragon/transcription disclaimer:  Much of this encounter note is electronic transcription/translation of spoken language to printed texts. The electronic translation of spoken language may be erroneous, or at times,nonsensical words or phrases may be inadvertently transcribed.   Although I have reviewed the note for such errors, some may still exist.

## 2019-06-14 ENCOUNTER — HOSPITAL ENCOUNTER (OUTPATIENT)
Dept: CT IMAGING | Age: 59
Discharge: HOME | End: 2019-06-14
Payer: COMMERCIAL

## 2019-06-14 ENCOUNTER — OFFICE VISIT (OUTPATIENT)
Dept: INTERNAL MEDICINE | Age: 59
End: 2019-06-14
Payer: COMMERCIAL

## 2019-06-14 VITALS
HEIGHT: 66 IN | RESPIRATION RATE: 18 BRPM | HEART RATE: 88 BPM | DIASTOLIC BLOOD PRESSURE: 54 MMHG | WEIGHT: 293 LBS | OXYGEN SATURATION: 94 % | SYSTOLIC BLOOD PRESSURE: 90 MMHG | BODY MASS INDEX: 47.09 KG/M2 | TEMPERATURE: 99.5 F

## 2019-06-14 DIAGNOSIS — R79.89 ELEVATED LFTS: ICD-10-CM

## 2019-06-14 DIAGNOSIS — R50.9 FEVER, UNSPECIFIED FEVER CAUSE: Primary | ICD-10-CM

## 2019-06-14 DIAGNOSIS — Z13.1 ENCOUNTER FOR SCREENING FOR DIABETES MELLITUS: ICD-10-CM

## 2019-06-14 DIAGNOSIS — R50.9 FEVER, UNSPECIFIED FEVER CAUSE: ICD-10-CM

## 2019-06-14 DIAGNOSIS — Z13.220 SCREENING FOR HYPERLIPIDEMIA: ICD-10-CM

## 2019-06-14 DIAGNOSIS — Z72.89 OTHER PROBLEMS RELATED TO LIFESTYLE: ICD-10-CM

## 2019-06-14 DIAGNOSIS — Z11.4 SCREENING FOR HIV WITHOUT PRESENCE OF RISK FACTORS: ICD-10-CM

## 2019-06-14 LAB
ALBUMIN SERPL-MCNC: 4.1 G/DL (ref 3.5–5.2)
ALP BLD-CCNC: 66 U/L (ref 40–130)
ALT SERPL-CCNC: 75 U/L (ref 5–41)
ANION GAP SERPL CALCULATED.3IONS-SCNC: 15 MMOL/L (ref 7–19)
AST SERPL-CCNC: 73 U/L (ref 5–40)
BASOPHILS ABSOLUTE: 0 K/UL (ref 0–0.2)
BASOPHILS RELATIVE PERCENT: 0.8 % (ref 0–1)
BILIRUB SERPL-MCNC: 1 MG/DL (ref 0.2–1.2)
BILIRUBIN URINE: NEGATIVE
BLOOD, URINE: NEGATIVE
BUN BLDV-MCNC: 18 MG/DL (ref 6–20)
CALCIUM SERPL-MCNC: 8.7 MG/DL (ref 8.6–10)
CHLORIDE BLD-SCNC: 91 MMOL/L (ref 98–111)
CLARITY: ABNORMAL
CO2: 27 MMOL/L (ref 22–29)
COLOR: ABNORMAL
CREAT SERPL-MCNC: 1.3 MG/DL (ref 0.5–1.2)
EOSINOPHILS ABSOLUTE: 0 K/UL (ref 0–0.6)
EOSINOPHILS RELATIVE PERCENT: 0.2 % (ref 0–5)
GFR NON-AFRICAN AMERICAN: 57
GLUCOSE BLD-MCNC: 117 MG/DL (ref 74–109)
GLUCOSE URINE: NEGATIVE MG/DL
HAV IGM SER IA-ACNC: NORMAL
HCT VFR BLD CALC: 42.6 % (ref 42–52)
HEMOGLOBIN: 14 G/DL (ref 14–18)
HEPATITIS B CORE IGM ANTIBODY: NORMAL
HEPATITIS B SURFACE ANTIGEN INTERPRETATION: NORMAL
HEPATITIS C ANTIBODY INTERPRETATION: NORMAL
KETONES, URINE: NEGATIVE MG/DL
LEUKOCYTE ESTERASE, URINE: NEGATIVE
LYMPHOCYTES ABSOLUTE: 0.9 K/UL (ref 1.1–4.5)
LYMPHOCYTES RELATIVE PERCENT: 19.3 % (ref 20–40)
MCH RBC QN AUTO: 26.4 PG (ref 27–31)
MCHC RBC AUTO-ENTMCNC: 32.9 G/DL (ref 33–37)
MCV RBC AUTO: 80.4 FL (ref 80–94)
MONOCYTES ABSOLUTE: 0.5 K/UL (ref 0–0.9)
MONOCYTES RELATIVE PERCENT: 10.9 % (ref 0–10)
NEUTROPHILS ABSOLUTE: 3.3 K/UL (ref 1.5–7.5)
NEUTROPHILS RELATIVE PERCENT: 68.4 % (ref 50–65)
NITRITE, URINE: NEGATIVE
PDW BLD-RTO: 14.2 % (ref 11.5–14.5)
PH UA: 6 (ref 5–8)
PLATELET # BLD: 145 K/UL (ref 130–400)
PMV BLD AUTO: 10.7 FL (ref 9.4–12.4)
POTASSIUM SERPL-SCNC: 3.6 MMOL/L (ref 3.5–5)
PROTEIN UA: ABNORMAL MG/DL
RBC # BLD: 5.3 M/UL (ref 4.7–6.1)
SODIUM BLD-SCNC: 133 MMOL/L (ref 136–145)
SPECIFIC GRAVITY UA: 1.02 (ref 1–1.03)
TOTAL PROTEIN: 7 G/DL (ref 6.6–8.7)
URINE REFLEX TO CULTURE: ABNORMAL
UROBILINOGEN, URINE: 0.2 E.U./DL
WBC # BLD: 4.9 K/UL (ref 4.8–10.8)

## 2019-06-14 PROCEDURE — 3017F COLORECTAL CA SCREEN DOC REV: CPT | Performed by: NURSE PRACTITIONER

## 2019-06-14 PROCEDURE — G8427 DOCREV CUR MEDS BY ELIG CLIN: HCPCS | Performed by: NURSE PRACTITIONER

## 2019-06-14 PROCEDURE — 1036F TOBACCO NON-USER: CPT | Performed by: NURSE PRACTITIONER

## 2019-06-14 PROCEDURE — G8417 CALC BMI ABV UP PARAM F/U: HCPCS | Performed by: NURSE PRACTITIONER

## 2019-06-14 PROCEDURE — 74176 CT ABD & PELVIS W/O CONTRAST: CPT

## 2019-06-14 PROCEDURE — 99214 OFFICE O/P EST MOD 30 MIN: CPT | Performed by: NURSE PRACTITIONER

## 2019-06-14 ASSESSMENT — ENCOUNTER SYMPTOMS
BLOOD IN STOOL: 0
STRIDOR: 0
SORE THROAT: 0
SHORTNESS OF BREATH: 0
COUGH: 0
ABDOMINAL DISTENTION: 0
NAUSEA: 0
ABDOMINAL PAIN: 0
EYE ITCHING: 0
BACK PAIN: 1
EYE DISCHARGE: 0
TROUBLE SWALLOWING: 0
COLOR CHANGE: 0
CONSTIPATION: 0
WHEEZING: 0
CHOKING: 0
VOMITING: 0
DIARRHEA: 1

## 2019-06-14 NOTE — PROGRESS NOTES
[facility]  [facility] [id number]  Dept: [phone]  Dept Fax: [phone]  Loc: [phone]    Lyndsey Dewey is a 62 y.o. male who presents today for his medical conditions/complaints as noted below. Lyndsey Dewey is c/opal Fever (Patient is here for follow up on fever and lab results. Patient states fever has been some better. Patient is now having low back pain and diarrhea.)        HPI:     HPI   1. Fever; He is here today for follow-up on fever  Symptoms and labs   He has has 104 temp fo the last part the last 3 days; He has had headache, back pain; His labs he had elevated LFt's  And today they are more elevated;    ths blood count is now looking more viral  ;  He has not eaten today for the CT ; but his b/p is low    He says he is feeling better with the headache almost gone; he no longer has neck pain. He has developed no other symptoms for the last few days. I discussed with Dr. Homer Hdz and we suggested that he go to [facility] for a spinal tap to rule out meningitis. I have brought this directly to Johnathan Walton and he just adamantly does not want to go to the hospital.  He says since he is feeling better today he would just like to go home try to drink fluids over the weekend and continue to take antibiotic therapy. Again I have told him we can do that but I cannot make sure that he does not have meningitis without a spinal tap. He understands but chooses to go home and see what happens over the weekend. We did do a CT today that showed nothing that would be a cause of him to have a fever. Again in the last week we had CT of his head and sinus x-rays were all negative  Chief Complaint   Patient presents with    Fever     Patient is here for follow up on fever and lab results. Patient states fever has been some better. Patient is now having low back pain and diarrhea.        Past Medical History:   Diagnosis Date    Hypertension       History reviewed. No pertinent surgical history. Vitals 6/14/2019 6/12/2019 3/4/2019 6/20/2018 4/17/2018 2/20/5812   SYSTOLIC 90 908 224 [phone]   DIASTOLIC 54 68 66 68 80 82   Pulse 88 95 93 87 85 75   Temp 99.5 100.1 - - 99.4 -   Resp 18 18 20 16 16 -   SpO2 94 94 95 94 94 94   Weight 293 lb 297 lb 304 lb 293 lb 287 lb 292 lb   Height 5' 6\" 5' 6\" 5' 6\" 5' 7\" 5' 7\" 5' 7\"   BMI (wt*703/ht~2) 47.29 kg/m2 47.93 kg/m2 49.06 kg/m2 45.89 kg/m2 44.95 kg/m2 45.73 kg/m2       Family History   Adopted: Yes       Social History     Tobacco Use    Smoking status: Never Smoker    Smokeless tobacco: Never Used   Substance Use Topics    Alcohol use: No      Current Outpatient Medications   Medication Sig Dispense Refill    promethazine (PHENERGAN) 25 MG tablet Take 1 tablet by mouth every 6 hours as needed for Nausea 28 tablet 2    cefdinir (OMNICEF) 300 MG capsule Take 1 capsule by mouth 2 times daily for 7 days 14 capsule 0    lisinopril (PRINIVIL;ZESTRIL) 40 MG tablet TAKE ONE TABLET BY MOUTH EVERY DAY 30 tablet 5    hydrochlorothiazide (HYDRODIURIL) 25 MG tablet Take 1 tablet by mouth daily 30 tablet 5    Omega-3 Fatty Acids (FISH OIL) 1000 MG CAPS Take 1,000 mg by mouth      CPAP Machine MISC by Does not apply route CPAP supplies 1 each 0     No current facility-administered medications for this visit.       Allergies   Allergen Reactions    Aspirin Swelling    Nsaids Hives and Itching       Health Maintenance   Topic Date Due    Hepatitis C screen  1960    HIV screen  08/13/1975    DTaP/Tdap/Td vaccine (1 - Tdap) 08/13/1979    Lipid screen  08/13/2000    Diabetes screen  08/13/2000    Shingles Vaccine (1 of 2) 08/13/2010    Colon cancer screen colonoscopy  08/13/2010    Potassium monitoring  06/12/2020    Creatinine monitoring  06/12/2020    Flu vaccine  Completed    Pneumococcal 0-64 years Vaccine  Aged Out       No results found for: [employer]  Lab Results   Component Value Date    PSA 1.05 06/12/2019     No results found for: TSH]  Lab Results   Component Value Date     (L) 06/14/2019    K 3.6 06/14/2019    CL 91 (L) 06/14/2019    CO2 27 06/14/2019    BUN 18 06/14/2019    CREATININE 1.3 (H) 06/14/2019    GLUCOSE 117 (H) 06/14/2019    CALCIUM 8.7 06/14/2019    PROT 7.0 06/14/2019    LABALBU 4.1 06/14/2019    BILITOT 1.0 06/14/2019    ALKPHOS 66 06/14/2019    AST 73 (H) 06/14/2019    ALT 75 (H) 06/14/2019    LABGLOM 57 (A) 06/14/2019     No results found for: CHOL  No results found for: TRIG  No results found for: HDL  No results found for: Loel Carlee [address]  Lab Results   Component Value Date     06/14/2019    K 3.6 06/14/2019    CL 91 06/14/2019    CO2 27 06/14/2019    BUN 18 06/14/2019    CREATININE 1.3 06/14/2019    GLUCOSE 117 06/14/2019    CALCIUM 8.7 06/14/2019      Lab Results   Component Value Date    WBC 4.9 06/14/2019    HGB 14.0 06/14/2019    HCT 42.6 06/14/2019    MCV 80.4 06/14/2019     06/14/2019    LYMPHOPCT 19.3 (L) 06/14/2019    RBC 5.30 06/14/2019    MCH 26.4 (L) 06/14/2019    MCHC 32.9 (L) 06/14/2019    RDW 14.2 06/14/2019     No results found for: VITD25    Subjective:      Review of Systems   Constitutional: Positive for fever. Negative for fatigue and unexpected weight change. HENT: Negative for ear discharge, ear pain, mouth sores, sore throat and trouble swallowing. Eyes: Negative for discharge, itching and visual disturbance. Respiratory: Negative for cough, choking, shortness of breath, wheezing and stridor. Cardiovascular: Negative for chest pain, palpitations and leg swelling. Gastrointestinal: Positive for diarrhea. Negative for abdominal distention, abdominal pain, blood in stool, constipation, nausea and vomiting. Endocrine: Negative for cold intolerance, polydipsia and polyuria. Genitourinary: Negative for difficulty urinating, dysuria, frequency and urgency. Musculoskeletal: Positive for back pain. lifestyle  Hepatitis C Antibody   4. Screening for HIV without presence of risk factors  HIV Rapid 1&2   5. Screening for hyperlipidemia  Lipid, Fasting   6. Encounter for screening for diabetes mellitus  Glucose, Fasting     Labs reviewedfrom today   Diagnostics reviewed from today   Plan:        Patient given educational materials - see patient instructions. Discussed use, benefit, and side effects of prescribed medications. Allpatient questions answered. Pt voiced understanding. Reviewed health maintenance. Instructed to continue current medications, diet and exercise. Patient agreed with treatment plan. Follow up as directed. MEDICATIONS:  No orders of the defined types were placed in this encounter. ORDERS:  Orders Placed This Encounter   Procedures    CBC Auto Differential    Comprehensive Metabolic Panel    Glucose, Fasting    Lipid, Fasting    HIV Rapid 1&2    Hepatitis C Antibody       Follow-up:  No follow-ups on file. PATIENT INSTRUCTIONS:  Patient Instructions   1 fever viral syndrome likely  Continue the antibiotic as prescribed drink plenty fluids to help with the renal insufficiency. I would prefer that she go to the ER to let them do a spinal tap but if you choose not to do so and over the weekend your fever remains 100 for you develop more symptoms or your headache returns she would need to go the ER to let them evaluate you for possible meningitis    Return on Monday for repeat labs and evaluation    Electronically signed by SHARON Chatterjee on 6/14/2019 at 12:43 PM    EMRDragon/transcription disclaimer:  Much of this encounter note is electronic transcription/translation of spoken language to printed texts. The electronic translation of spoken language may be erroneous, or at times,nonsensical words or phrases may be inadvertently transcribed.   Although I have reviewed the note for such errors, some may still exist.

## 2019-06-17 ENCOUNTER — OFFICE VISIT (OUTPATIENT)
Dept: INTERNAL MEDICINE | Age: 59
End: 2019-06-17
Payer: COMMERCIAL

## 2019-06-17 VITALS
OXYGEN SATURATION: 98 % | DIASTOLIC BLOOD PRESSURE: 68 MMHG | HEART RATE: 80 BPM | TEMPERATURE: 97.6 F | HEIGHT: 66 IN | SYSTOLIC BLOOD PRESSURE: 108 MMHG | BODY MASS INDEX: 47.25 KG/M2 | WEIGHT: 294 LBS

## 2019-06-17 DIAGNOSIS — R79.89 ELEVATED LFTS: ICD-10-CM

## 2019-06-17 DIAGNOSIS — R50.9 FEVER, UNSPECIFIED FEVER CAUSE: ICD-10-CM

## 2019-06-17 DIAGNOSIS — Z72.89 OTHER PROBLEMS RELATED TO LIFESTYLE: ICD-10-CM

## 2019-06-17 DIAGNOSIS — Z13.220 SCREENING FOR HYPERLIPIDEMIA: ICD-10-CM

## 2019-06-17 DIAGNOSIS — Z13.1 ENCOUNTER FOR SCREENING FOR DIABETES MELLITUS: ICD-10-CM

## 2019-06-17 DIAGNOSIS — B34.9 VIRAL SYNDROME: ICD-10-CM

## 2019-06-17 DIAGNOSIS — R79.89 ELEVATED LFTS: Primary | ICD-10-CM

## 2019-06-17 DIAGNOSIS — Z11.4 SCREENING FOR HIV WITHOUT PRESENCE OF RISK FACTORS: ICD-10-CM

## 2019-06-17 LAB
ALBUMIN SERPL-MCNC: 3.9 G/DL (ref 3.5–5.2)
ALP BLD-CCNC: 66 U/L (ref 40–130)
ALT SERPL-CCNC: 81 U/L (ref 5–41)
ANION GAP SERPL CALCULATED.3IONS-SCNC: 10 MMOL/L (ref 7–19)
AST SERPL-CCNC: 54 U/L (ref 5–40)
ATYPICAL LYMPHOCYTE RELATIVE PERCENT: 6 % (ref 0–8)
BASOPHILS ABSOLUTE: 0 K/UL (ref 0–0.2)
BASOPHILS RELATIVE PERCENT: 0 % (ref 0–1)
BILIRUB SERPL-MCNC: 0.5 MG/DL (ref 0.2–1.2)
BLOOD CULTURE, ROUTINE: NORMAL
BUN BLDV-MCNC: 18 MG/DL (ref 6–20)
CALCIUM SERPL-MCNC: 9.1 MG/DL (ref 8.6–10)
CHLORIDE BLD-SCNC: 95 MMOL/L (ref 98–111)
CHOLESTEROL, FASTING: 168 MG/DL (ref 160–199)
CO2: 33 MMOL/L (ref 22–29)
CREAT SERPL-MCNC: 0.9 MG/DL (ref 0.5–1.2)
CULTURE, BLOOD 2: NORMAL
EOSINOPHILS ABSOLUTE: 0.09 K/UL (ref 0–0.6)
EOSINOPHILS RELATIVE PERCENT: 2 % (ref 0–5)
GFR NON-AFRICAN AMERICAN: >60
GLUCOSE BLD-MCNC: 109 MG/DL (ref 74–109)
HCT VFR BLD CALC: 41 % (ref 42–52)
HDLC SERPL-MCNC: 28 MG/DL (ref 55–121)
HEMOGLOBIN: 13.3 G/DL (ref 14–18)
HEPATITIS C ANTIBODY INTERPRETATION: NORMAL
HIV-1 P24 AG: NORMAL
HYPOCHROMIA: ABNORMAL
LDL CHOLESTEROL CALCULATED: 114 MG/DL
LYMPHOCYTES ABSOLUTE: 1.2 K/UL (ref 1.1–4.5)
LYMPHOCYTES RELATIVE PERCENT: 20 % (ref 20–40)
MCH RBC QN AUTO: 26.5 PG (ref 27–31)
MCHC RBC AUTO-ENTMCNC: 32.4 G/DL (ref 33–37)
MCV RBC AUTO: 81.8 FL (ref 80–94)
MONOCYTES ABSOLUTE: 0.2 K/UL (ref 0–0.9)
MONOCYTES RELATIVE PERCENT: 5 % (ref 0–10)
NEUTROPHILS ABSOLUTE: 3.1 K/UL (ref 1.5–7.5)
NEUTROPHILS RELATIVE PERCENT: 67 % (ref 50–65)
PDW BLD-RTO: 14.3 % (ref 11.5–14.5)
PLATELET # BLD: 213 K/UL (ref 130–400)
PLATELET SLIDE REVIEW: ADEQUATE
PMV BLD AUTO: 9.9 FL (ref 9.4–12.4)
POTASSIUM SERPL-SCNC: 4 MMOL/L (ref 3.5–5)
RAPID HIV 1&2: NORMAL
RBC # BLD: 5.01 M/UL (ref 4.7–6.1)
SODIUM BLD-SCNC: 138 MMOL/L (ref 136–145)
TOTAL PROTEIN: 7 G/DL (ref 6.6–8.7)
TRIGLYCERIDE, FASTING: 132 MG/DL (ref 0–149)
WBC # BLD: 4.6 K/UL (ref 4.8–10.8)

## 2019-06-17 PROCEDURE — G8417 CALC BMI ABV UP PARAM F/U: HCPCS | Performed by: NURSE PRACTITIONER

## 2019-06-17 PROCEDURE — 3017F COLORECTAL CA SCREEN DOC REV: CPT | Performed by: NURSE PRACTITIONER

## 2019-06-17 PROCEDURE — G8427 DOCREV CUR MEDS BY ELIG CLIN: HCPCS | Performed by: NURSE PRACTITIONER

## 2019-06-17 PROCEDURE — 1036F TOBACCO NON-USER: CPT | Performed by: NURSE PRACTITIONER

## 2019-06-17 PROCEDURE — 99213 OFFICE O/P EST LOW 20 MIN: CPT | Performed by: NURSE PRACTITIONER

## 2019-06-17 ASSESSMENT — ENCOUNTER SYMPTOMS
STRIDOR: 0
COUGH: 0
EYE ITCHING: 0
COLOR CHANGE: 0
EYE DISCHARGE: 0
NAUSEA: 1
ABDOMINAL PAIN: 0
ABDOMINAL DISTENTION: 0
BLOOD IN STOOL: 0
DIARRHEA: 0
WHEEZING: 0
TROUBLE SWALLOWING: 0
VOMITING: 0
SORE THROAT: 0
SHORTNESS OF BREATH: 0
CHOKING: 0
CONSTIPATION: 0

## 2019-06-17 NOTE — PATIENT INSTRUCTIONS
1.  Fever   2.   body aches   #3 nausea  4 elevated LFTs  All of the symptoms have essentially subsided. His blood work is somewhat better today other than slightly elevated LFTs more so than on Friday. Has all of his labs looking like is going back to normal we will just repeat this in a couple weeks to be sure that we are trending in the right direction.   All of his other labs his PSA and all the hepatitis panels have been within normal and urine has cleared

## 2019-07-09 RX ORDER — LISINOPRIL 40 MG/1
TABLET ORAL
Qty: 30 TABLET | Refills: 5 | Status: SHIPPED | OUTPATIENT
Start: 2019-07-09 | End: 2020-01-21 | Stop reason: SDUPTHER

## 2019-07-09 RX ORDER — HYDROCHLOROTHIAZIDE 25 MG/1
25 TABLET ORAL DAILY
Qty: 30 TABLET | Refills: 5 | Status: SHIPPED | OUTPATIENT
Start: 2019-07-09 | End: 2019-08-12 | Stop reason: SDUPTHER

## 2019-08-12 RX ORDER — HYDROCHLOROTHIAZIDE 25 MG/1
25 TABLET ORAL DAILY
Qty: 30 TABLET | Refills: 5 | Status: SHIPPED | OUTPATIENT
Start: 2019-08-12 | End: 2020-06-18

## 2020-01-21 RX ORDER — LISINOPRIL 40 MG/1
TABLET ORAL
Qty: 30 TABLET | Refills: 5 | Status: SHIPPED | OUTPATIENT
Start: 2020-01-21 | End: 2020-06-18

## 2020-06-18 ENCOUNTER — VIRTUAL VISIT (OUTPATIENT)
Dept: INTERNAL MEDICINE | Age: 60
End: 2020-06-18

## 2020-06-18 PROBLEM — I10 ESSENTIAL HYPERTENSION: Status: ACTIVE | Noted: 2020-06-18

## 2020-06-18 PROBLEM — G47.33 OSA (OBSTRUCTIVE SLEEP APNEA): Status: ACTIVE | Noted: 2020-06-18

## 2020-06-18 PROCEDURE — 99213 OFFICE O/P EST LOW 20 MIN: CPT | Performed by: NURSE PRACTITIONER

## 2020-06-18 PROCEDURE — 1036F TOBACCO NON-USER: CPT | Performed by: NURSE PRACTITIONER

## 2020-06-18 PROCEDURE — G8427 DOCREV CUR MEDS BY ELIG CLIN: HCPCS | Performed by: NURSE PRACTITIONER

## 2020-06-18 PROCEDURE — 3017F COLORECTAL CA SCREEN DOC REV: CPT | Performed by: NURSE PRACTITIONER

## 2020-06-18 PROCEDURE — G8421 BMI NOT CALCULATED: HCPCS | Performed by: NURSE PRACTITIONER

## 2020-06-18 RX ORDER — LISINOPRIL 40 MG/1
TABLET ORAL
Qty: 30 TABLET | Refills: 1 | Status: SHIPPED | OUTPATIENT
Start: 2020-06-18 | End: 2020-09-01 | Stop reason: SDUPTHER

## 2020-06-18 RX ORDER — HYDROCHLOROTHIAZIDE 25 MG/1
TABLET ORAL
Qty: 30 TABLET | Refills: 1 | Status: SHIPPED | OUTPATIENT
Start: 2020-06-18 | End: 2020-09-01

## 2020-06-18 ASSESSMENT — ENCOUNTER SYMPTOMS
VOMITING: 0
EYE ITCHING: 0
WHEEZING: 0
TROUBLE SWALLOWING: 0
COLOR CHANGE: 0
EYE DISCHARGE: 0
SHORTNESS OF BREATH: 0
ABDOMINAL PAIN: 0
NAUSEA: 0
BLOOD IN STOOL: 0
DIARRHEA: 0
COUGH: 0
SORE THROAT: 0
ABDOMINAL DISTENTION: 0
CONSTIPATION: 0
CHOKING: 0
STRIDOR: 0

## 2020-06-18 NOTE — PATIENT INSTRUCTIONS
1.    HTN:  Refill meds;   Please get pressure checked in the near future and let me know what it is; Also when you get your insurance, please let me know so we can get your labs;    2. KADEN:  Stable with Cpap and is compliant   3. Osteoarthritis; Stable for now;   No change

## 2020-06-18 NOTE — PROGRESS NOTES
Primary osteoarthritis involving multiple joints     2. Essential hypertension  CBC Auto Differential    Comprehensive Metabolic Panel    Urinalysis Reflex to Culture    TSH without Reflex   3. KADEN (obstructive sleep apnea)     4. Encounter for screening for malignant neoplasm of prostate  Psa screening   5. Healthcare maintenance  CBC Auto Differential    Comprehensive Metabolic Panel    Lipid Panel    Vitamin D 25 Hydroxy    Urinalysis Reflex to Culture    TSH without Reflex    Psa screening     Labs reviewedfrom last year     Plan:        Patient given educational materials - see patient instructions. Discussed use, benefit, and side effects of prescribed medications. Allpatient questions answered. Pt voiced understanding. Reviewed health maintenance. Instructed to continue current medications, diet and exercise. Patient agreed with treatment plan. Follow up as directed. MEDICATIONS:  No orders of the defined types were placed in this encounter. ORDERS:  Orders Placed This Encounter   Procedures    CBC Auto Differential    Comprehensive Metabolic Panel    Lipid Panel    Vitamin D 25 Hydroxy    Urinalysis Reflex to Culture    TSH without Reflex    Psa screening       Follow-up:  Return in about 6 months (around 12/18/2020) for have labs done prior to appt. Following the remote visit today we will call you when we are available to reschedule your follow up appt      PATIENT INSTRUCTIONS:  Patient Instructions   1. HTN:  Refill meds;   Please get pressure checked in the near future and let me know what it is; Also when you get your insurance, please let me know so we can get your labs;    2. KADEN:  Stable with Cpap and is compliant   3. Osteoarthritis; Stable for now;   No change     Electronically signed by SHARON Archibald on 6/18/2020 at 10:58 AM   doxy  Visit   We are communicating with telehealth for the 3 month fu for controlled substance      Pursuant to the emergency

## 2020-06-22 ASSESSMENT — PATIENT HEALTH QUESTIONNAIRE - PHQ9
SUM OF ALL RESPONSES TO PHQ QUESTIONS 1-9: 0
1. LITTLE INTEREST OR PLEASURE IN DOING THINGS: 0
SUM OF ALL RESPONSES TO PHQ QUESTIONS 1-9: 0

## 2020-08-28 DIAGNOSIS — Z00.00 HEALTHCARE MAINTENANCE: ICD-10-CM

## 2020-08-28 DIAGNOSIS — Z12.5 ENCOUNTER FOR SCREENING FOR MALIGNANT NEOPLASM OF PROSTATE: ICD-10-CM

## 2020-08-28 DIAGNOSIS — I10 ESSENTIAL HYPERTENSION: ICD-10-CM

## 2020-08-28 LAB
ALBUMIN SERPL-MCNC: 4.1 G/DL (ref 3.5–5.2)
ALP BLD-CCNC: 64 U/L (ref 40–130)
ALT SERPL-CCNC: 25 U/L (ref 5–41)
ANION GAP SERPL CALCULATED.3IONS-SCNC: 14 MMOL/L (ref 7–19)
AST SERPL-CCNC: 19 U/L (ref 5–40)
BACTERIA: ABNORMAL /HPF
BASOPHILS ABSOLUTE: 0.1 K/UL (ref 0–0.2)
BASOPHILS RELATIVE PERCENT: 1 % (ref 0–1)
BILIRUB SERPL-MCNC: 0.4 MG/DL (ref 0.2–1.2)
BILIRUBIN URINE: NEGATIVE
BLOOD, URINE: ABNORMAL
BUN BLDV-MCNC: 15 MG/DL (ref 8–23)
CALCIUM SERPL-MCNC: 9.7 MG/DL (ref 8.8–10.2)
CHLORIDE BLD-SCNC: 99 MMOL/L (ref 98–111)
CHOLESTEROL, TOTAL: 181 MG/DL (ref 160–199)
CLARITY: CLEAR
CO2: 29 MMOL/L (ref 22–29)
COLOR: YELLOW
CREAT SERPL-MCNC: 0.9 MG/DL (ref 0.5–1.2)
CRYSTALS, UA: ABNORMAL /HPF
EOSINOPHILS ABSOLUTE: 0.2 K/UL (ref 0–0.6)
EOSINOPHILS RELATIVE PERCENT: 2.4 % (ref 0–5)
EPITHELIAL CELLS, UA: 1 /HPF (ref 0–5)
GFR AFRICAN AMERICAN: >59
GFR NON-AFRICAN AMERICAN: >60
GLUCOSE BLD-MCNC: 100 MG/DL (ref 74–109)
GLUCOSE URINE: NEGATIVE MG/DL
HCT VFR BLD CALC: 45.8 % (ref 42–52)
HDLC SERPL-MCNC: 55 MG/DL (ref 55–121)
HEMOGLOBIN: 14.3 G/DL (ref 14–18)
HYALINE CASTS: 6 /HPF (ref 0–8)
IMMATURE GRANULOCYTES #: 0 K/UL
KETONES, URINE: NEGATIVE MG/DL
LDL CHOLESTEROL CALCULATED: 108 MG/DL
LEUKOCYTE ESTERASE, URINE: ABNORMAL
LYMPHOCYTES ABSOLUTE: 2.1 K/UL (ref 1.1–4.5)
LYMPHOCYTES RELATIVE PERCENT: 25.7 % (ref 20–40)
MCH RBC QN AUTO: 26.9 PG (ref 27–31)
MCHC RBC AUTO-ENTMCNC: 31.2 G/DL (ref 33–37)
MCV RBC AUTO: 86.1 FL (ref 80–94)
MONOCYTES ABSOLUTE: 0.6 K/UL (ref 0–0.9)
MONOCYTES RELATIVE PERCENT: 7.8 % (ref 0–10)
NEUTROPHILS ABSOLUTE: 5.1 K/UL (ref 1.5–7.5)
NEUTROPHILS RELATIVE PERCENT: 62.7 % (ref 50–65)
NITRITE, URINE: NEGATIVE
PDW BLD-RTO: 13.7 % (ref 11.5–14.5)
PH UA: 5.5 (ref 5–8)
PLATELET # BLD: 271 K/UL (ref 130–400)
PMV BLD AUTO: 9.6 FL (ref 9.4–12.4)
POTASSIUM SERPL-SCNC: 4.7 MMOL/L (ref 3.5–5)
PROSTATE SPECIFIC ANTIGEN: 1.62 NG/ML (ref 0–4)
PROTEIN UA: NEGATIVE MG/DL
RBC # BLD: 5.32 M/UL (ref 4.7–6.1)
RBC UA: 2 /HPF (ref 0–4)
SODIUM BLD-SCNC: 142 MMOL/L (ref 136–145)
SPECIFIC GRAVITY UA: 1.02 (ref 1–1.03)
TOTAL PROTEIN: 7.4 G/DL (ref 6.6–8.7)
TRIGL SERPL-MCNC: 89 MG/DL (ref 0–149)
TSH SERPL DL<=0.05 MIU/L-ACNC: 1.74 UIU/ML (ref 0.27–4.2)
UROBILINOGEN, URINE: 0.2 E.U./DL
VITAMIN D 25-HYDROXY: 37.5 NG/ML
WBC # BLD: 8.2 K/UL (ref 4.8–10.8)
WBC UA: 27 /HPF (ref 0–5)

## 2020-08-30 LAB
ORGANISM: ABNORMAL
URINE CULTURE, ROUTINE: ABNORMAL
URINE CULTURE, ROUTINE: ABNORMAL

## 2020-09-01 ENCOUNTER — VIRTUAL VISIT (OUTPATIENT)
Dept: INTERNAL MEDICINE | Age: 60
End: 2020-09-01
Payer: COMMERCIAL

## 2020-09-01 PROCEDURE — 99213 OFFICE O/P EST LOW 20 MIN: CPT | Performed by: NURSE PRACTITIONER

## 2020-09-01 RX ORDER — NITROFURANTOIN MACROCRYSTALS 100 MG/1
100 CAPSULE ORAL 4 TIMES DAILY
Qty: 20 CAPSULE | Refills: 0 | Status: SHIPPED | OUTPATIENT
Start: 2020-09-01 | End: 2020-09-01 | Stop reason: SDUPTHER

## 2020-09-01 RX ORDER — NITROFURANTOIN MACROCRYSTALS 100 MG/1
100 CAPSULE ORAL 2 TIMES DAILY
Qty: 20 CAPSULE | Refills: 0 | Status: SHIPPED | OUTPATIENT
Start: 2020-09-01 | End: 2020-09-11

## 2020-09-01 RX ORDER — LISINOPRIL 40 MG/1
TABLET ORAL
Qty: 90 TABLET | Refills: 1 | Status: SHIPPED | OUTPATIENT
Start: 2020-09-01 | End: 2020-12-14 | Stop reason: ALTCHOICE

## 2020-09-01 RX ORDER — HYDROCHLOROTHIAZIDE 25 MG/1
25 TABLET ORAL DAILY
Qty: 90 TABLET | Refills: 1 | Status: SHIPPED | OUTPATIENT
Start: 2020-09-01 | End: 2020-12-14 | Stop reason: ALTCHOICE

## 2020-09-01 ASSESSMENT — ENCOUNTER SYMPTOMS
NAUSEA: 0
EYE DISCHARGE: 0
SHORTNESS OF BREATH: 0
COUGH: 0
STRIDOR: 0
DIARRHEA: 0
ABDOMINAL DISTENTION: 0
ABDOMINAL PAIN: 0
TROUBLE SWALLOWING: 0
BLOOD IN STOOL: 0
WHEEZING: 0
CONSTIPATION: 0
SORE THROAT: 0
COLOR CHANGE: 0
VOMITING: 0
EYE ITCHING: 0
CHOKING: 0

## 2020-09-01 NOTE — PROGRESS NOTES
[address] [facility]  [address] [address] [id number] [address] [id number]  Dept: [phone]  Dept Fax: [phone]  Loc: [phone]    Julien Soares is a 61 y.o. male who were are performing tele health communication  for his medical conditions/complaints as noted below. Currently, our state is under umbrella of national state of emergency and we are using alternative methods of taking care of the needs of our patients so they are not exposed in our office; The patient has consented to this form of communication  Julien Soares is c/opal   Hypertension    THE patient is at home  Sophia Burn is at work  Others in attendance are none    HPI:     HPI   #1 UTI his routine labs revealed a enterococcus UTI. At that time, puzzled about this he does have a history of prostatitis but he has no real symptoms right now. His white count is normal.  In addition there is a moderate amount of blood in his urine. #2 hypertension blood pressure readings have been normal at home he is on lisinopril 40 and HCTZ 25 mg daily his labs are okay regarding his medications  3. Sleep apnea he is consistent with his CPAP he feels better when he wears this. There is very few times that he is not wear it all night long. Chief Complaint   Patient presents with    Hypertension       Past Medical History:   Diagnosis Date    Hypertension       No past surgical history on file.     Vitals 6/17/2019 6/14/2019 6/12/2019 3/4/2019 6/20/2018 3/34/5352   SYSTOLIC 591 90 [phone]   DIASTOLIC 68 54 68 66 68 80   Pulse 80 88 95 93 87 85   Temp 97.6 99.5 100.1 - - 99.4   Resp - 18 18 20 16 16   SpO2 98 94 94 95 94 94   Weight 294 lb 293 lb 297 lb 304 lb 293 lb 287 lb   Height 5' 6\" 5' 6\" 5' 6\" 5' 6\" 5' 7\" 5' 7\"   BMI (wt*703/ht~2) 47.45 kg/m2 47.29 kg/m2 47.93 kg/m2 49.06 kg/m2 45.89 kg/m2 44.95 kg/m2       Family History   Adopted: Yes       Social History     Tobacco Use    Smoking status: Never Smoker    Smokeless tobacco: Never Used   Substance Use Topics    Alcohol use: No      Current Outpatient Medications   Medication Sig Dispense Refill    nitrofurantoin (MACRODANTIN) 100 MG capsule Take 1 capsule by mouth 4 times daily for 10 days 20 capsule 0    hydroCHLOROthiazide (HYDRODIURIL) 25 MG tablet Take 1 tablet by mouth daily 90 tablet 1    lisinopril (PRINIVIL;ZESTRIL) 40 MG tablet Take 1 tablet by mouth once daily 90 tablet 1    Omega-3 Fatty Acids (FISH OIL) 1000 MG CAPS Take 1,000 mg by mouth      CPAP Machine MISC by Does not apply route CPAP supplies 1 each 0     No current facility-administered medications for this visit.       Allergies   Allergen Reactions    Aspirin Swelling    Nsaids Hives and Itching       Health Maintenance   Topic Date Due    Flu vaccine (1) 09/01/2020    DTaP/Tdap/Td vaccine (1 - Tdap) 09/21/2020 (Originally 8/13/1979)    Shingles Vaccine (1 of 2) 06/18/2021 (Originally 8/13/2010)    Potassium monitoring  08/28/2021    Creatinine monitoring  08/28/2021    Colon cancer screen colonoscopy  03/08/2022    Lipid screen  08/28/2025    Hepatitis C screen  Completed    HIV screen  Completed    Hepatitis A vaccine  Aged Out    Hepatitis B vaccine  Aged Out    Hib vaccine  Aged Out    Meningococcal (ACWY) vaccine  Aged Out    Pneumococcal 0-64 years Vaccine  Aged Out       No results found for: LABA1C  Lab Results   Component Value Date    PSA 1.62 08/28/2020    PSA 1.05 06/12/2019     TSH   Date Value Ref Range Status   08/28/2020 1.740 0.270 - 4.200 uIU/mL Final   ]  Lab Results   Component Value Date     08/28/2020    K 4.7 08/28/2020    CL 99 08/28/2020    CO2 29 08/28/2020    BUN 15 08/28/2020    CREATININE 0.9 08/28/2020    GLUCOSE 100 08/28/2020    CALCIUM 9.7 08/28/2020    PROT 7.4 08/28/2020    LABALBU 4.1 08/28/2020    BILITOT 0.4 08/28/2020    ALKPHOS 64 08/28/2020    AST 19 08/28/2020    ALT 25 08/28/2020    LABGLOM >60 08/28/2020 GFRAA >59 08/28/2020     Lab Results   Component Value Date    CHOL 181 08/28/2020     Lab Results   Component Value Date    TRIG 89 08/28/2020     Lab Results   Component Value Date    HDL 55 08/28/2020    HDL 28 (L) 06/17/2019     Lab Results   Component Value Date    LDLCALC 108 08/28/2020    LDLCALC 114 06/17/2019     Lab Results   Component Value Date     08/28/2020    K 4.7 08/28/2020    CL 99 08/28/2020    CO2 29 08/28/2020    BUN 15 08/28/2020    CREATININE 0.9 08/28/2020    GLUCOSE 100 08/28/2020    CALCIUM 9.7 08/28/2020      Lab Results   Component Value Date    WBC 8.2 08/28/2020    HGB 14.3 08/28/2020    HCT 45.8 08/28/2020    MCV 86.1 08/28/2020     08/28/2020    LYMPHOPCT 25.7 08/28/2020    RBC 5.32 08/28/2020    MCH 26.9 (L) 08/28/2020    MCHC 31.2 (L) 08/28/2020    RDW 13.7 08/28/2020     Lab Results   Component Value Date    VITD25 37.5 08/28/2020       Subjective:      Review of Systems   Constitutional: Negative for fatigue, fever and unexpected weight change. HENT: Negative for ear discharge, ear pain, mouth sores, sore throat and trouble swallowing. Eyes: Negative for discharge, itching and visual disturbance. Respiratory: Negative for cough, choking, shortness of breath, wheezing and stridor. Cardiovascular: Negative for chest pain, palpitations and leg swelling. Gastrointestinal: Negative for abdominal distention, abdominal pain, blood in stool, constipation, diarrhea, nausea and vomiting. Endocrine: Negative for cold intolerance, polydipsia and polyuria. Genitourinary: Negative for difficulty urinating, dysuria, frequency and urgency. Musculoskeletal: Negative for arthralgias and gait problem. Skin: Negative for color change and rash. Allergic/Immunologic: Negative for food allergies and immunocompromised state. Neurological: Negative for dizziness, tremors, syncope, speech difficulty, weakness and headaches.         Sleep apnea   Hematological: Negative for adenopathy. Does not bruise/bleed easily. Psychiatric/Behavioral: Negative for confusion and hallucinations. Objective:     Physical Exam   DGEVISITPE      GENERAL:   Afebrile alert no acute distress  Respiratory no use of accessory muscles no acute distress no audible wheezing  Mental status alert oriented adequate thought processes        Vital signs were not taken at this visit as no equipment was available;          Assessment:       Diagnosis Orders   1. Urinary tract infection with hematuria, site unspecified  Urinalysis Reflex to Culture   2. Essential hypertension     3. KADEN (obstructive sleep apnea)       Labs reviewedfrom 8/28/2020    Plan:        Patient given educational materials - see patient instructions. Discussed use, benefit, and side effects of prescribed medications. Allpatient questions answered. Pt voiced understanding. Reviewed health maintenance. Instructed to continue current medications, diet and exercise. Patient agreed with treatment plan. Follow up as directed. MEDICATIONS:  Orders Placed This Encounter   Medications    nitrofurantoin (MACRODANTIN) 100 MG capsule     Sig: Take 1 capsule by mouth 4 times daily for 10 days     Dispense:  20 capsule     Refill:  0    hydroCHLOROthiazide (HYDRODIURIL) 25 MG tablet     Sig: Take 1 tablet by mouth daily     Dispense:  90 tablet     Refill:  1    lisinopril (PRINIVIL;ZESTRIL) 40 MG tablet     Sig: Take 1 tablet by mouth once daily     Dispense:  90 tablet     Refill:  1         ORDERS:  Orders Placed This Encounter   Procedures    Urinalysis Reflex to Culture       Follow-up:  Return in about 3 months (around 12/1/2020) for have labs done prior to appt. Following the remote visit today we will call you when we are available to reschedule your follow up appt      PATIENT INSTRUCTIONS:  Patient Instructions   1.   UTI enterococcus we will try Macrodantin to see if that will clear this before we move to stronger medications. I sent you in a prescription for twice a day dosing for 10 days. On about the 24th the 22nd of this month come back over and repeat your urine. 2.  Hypertension refill meds today no changes are necessary  3. Obstructive sleep apnea continue with your CPAP no changes are necessary    Electronically signed by SHARON Sanabria on 9/1/2020 at 8:14 AM  televisit 18 minutes  We are communicating with telehealth for the 3 month fu for controlled substance      Pursuant to the emergency declaration under the [facility], 1135 waiver authority and the [employer] Act, this Virtual Visit was conducted, with patient's consent, to reduce the patient's risk of exposure to COVID-19 and provide continuity of care for an established patient.        EMRDragon/transcription disclaimer:  Much of this encounter note is electronic

## 2020-09-18 DIAGNOSIS — N39.0 URINARY TRACT INFECTION WITH HEMATURIA, SITE UNSPECIFIED: ICD-10-CM

## 2020-09-18 DIAGNOSIS — R31.9 URINARY TRACT INFECTION WITH HEMATURIA, SITE UNSPECIFIED: ICD-10-CM

## 2020-09-18 LAB
BACTERIA: NEGATIVE /HPF
BILIRUBIN URINE: NEGATIVE
BLOOD, URINE: ABNORMAL
CLARITY: CLEAR
COLOR: YELLOW
CRYSTALS, UA: ABNORMAL /HPF
EPITHELIAL CELLS, UA: 0 /HPF (ref 0–5)
GLUCOSE URINE: NEGATIVE MG/DL
HYALINE CASTS: 3 /HPF (ref 0–8)
KETONES, URINE: NEGATIVE MG/DL
LEUKOCYTE ESTERASE, URINE: NEGATIVE
NITRITE, URINE: NEGATIVE
PH UA: 5.5 (ref 5–8)
PROTEIN UA: NEGATIVE MG/DL
RBC UA: 6 /HPF (ref 0–4)
SPECIFIC GRAVITY UA: 1.02 (ref 1–1.03)
UROBILINOGEN, URINE: 0.2 E.U./DL
WBC UA: 1 /HPF (ref 0–5)

## 2020-10-16 DIAGNOSIS — R31.21 ASYMPTOMATIC MICROSCOPIC HEMATURIA: ICD-10-CM

## 2020-10-16 LAB
BACTERIA: NEGATIVE /HPF
BILIRUBIN URINE: NEGATIVE
BLOOD, URINE: ABNORMAL
CLARITY: CLEAR
COLOR: YELLOW
CRYSTALS, UA: ABNORMAL /HPF
EPITHELIAL CELLS, UA: 0 /HPF (ref 0–5)
GLUCOSE URINE: NEGATIVE MG/DL
HYALINE CASTS: 0 /HPF (ref 0–8)
KETONES, URINE: 15 MG/DL
LEUKOCYTE ESTERASE, URINE: NEGATIVE
NITRITE, URINE: NEGATIVE
PH UA: 5 (ref 5–8)
PROTEIN UA: NEGATIVE MG/DL
RBC UA: 2 /HPF (ref 0–4)
SPECIFIC GRAVITY UA: 1.02 (ref 1–1.03)
UROBILINOGEN, URINE: 0.2 E.U./DL
WBC UA: 0 /HPF (ref 0–5)

## 2020-11-06 ENCOUNTER — OFFICE VISIT (OUTPATIENT)
Dept: UROLOGY | Age: 60
End: 2020-11-06

## 2020-11-06 VITALS — BODY MASS INDEX: 49.98 KG/M2 | HEIGHT: 66 IN | TEMPERATURE: 97.6 F | WEIGHT: 311 LBS

## 2020-11-06 LAB
BACTERIA URINE, POC: ABNORMAL
BILIRUBIN URINE: 0 MG/DL
BLOOD, URINE: POSITIVE
CASTS URINE, POC: ABNORMAL
CLARITY: CLEAR
COLOR: YELLOW
CRYSTALS URINE, POC: ABNORMAL
EPI CELLS URINE, POC: ABNORMAL
GLUCOSE URINE: ABNORMAL
KETONES, URINE: NEGATIVE
LEUKOCYTE EST, POC: ABNORMAL
NITRITE, URINE: NEGATIVE
PH UA: 5.5 (ref 4.5–8)
PROTEIN UA: NEGATIVE
RBC URINE, POC: ABNORMAL
SPECIFIC GRAVITY UA: 1.02 (ref 1–1.03)
UROBILINOGEN, URINE: NORMAL
WBC URINE, POC: ABNORMAL
YEAST URINE, POC: ABNORMAL

## 2020-11-06 PROCEDURE — 99204 OFFICE O/P NEW MOD 45 MIN: CPT | Performed by: NURSE PRACTITIONER

## 2020-11-06 PROCEDURE — 81001 URINALYSIS AUTO W/SCOPE: CPT | Performed by: NURSE PRACTITIONER

## 2020-11-06 ASSESSMENT — ENCOUNTER SYMPTOMS
EYE PAIN: 0
NAUSEA: 0
ABDOMINAL PAIN: 0
VOMITING: 0
SORE THROAT: 0
WHEEZING: 0
BACK PAIN: 0
COUGH: 0

## 2020-11-06 ASSESSMENT — LIFESTYLE VARIABLES: TOBACCO_USE: 0

## 2020-11-06 NOTE — PROGRESS NOTES
Sonia Javier is a 61 y.o. male who presents today   Chief Complaint   Patient presents with    New Patient     i am here for blood in my urine. Hematuria   This is a new problem. The current episode started more than 1 month ago (8/28/2020). The problem has been gradually improving since onset. He describes the hematuria as microscopic hematuria. He reports no clotting in his urine stream. He is experiencing no pain. He describes his urine color as clear. Irritative symptoms do not include frequency, nocturia or urgency. Obstructive symptoms do not include dribbling, incomplete emptying, an intermittent stream, a slower stream, straining or a weak stream. Associated symptoms include hesitancy (when infection was present). Pertinent negatives include no abdominal pain, chills, dysuria, fever, flank pain, genital pain, inability to urinate, nausea or vomiting. His past medical history is significant for hypertension and recent infection. There is no history of kidney stones or tobacco use. Past Medical History:   Diagnosis Date    Hypertension        History reviewed. No pertinent surgical history. Current Outpatient Medications   Medication Sig Dispense Refill    Multiple Vitamins-Minerals (CENTRUM SILVER PO) Take by mouth      hydroCHLOROthiazide (HYDRODIURIL) 25 MG tablet Take 1 tablet by mouth daily 90 tablet 1    lisinopril (PRINIVIL;ZESTRIL) 40 MG tablet Take 1 tablet by mouth once daily 90 tablet 1    Omega-3 Fatty Acids (FISH OIL) 1000 MG CAPS Take 1,000 mg by mouth      CPAP Machine MISC by Does not apply route CPAP supplies 1 each 0     No current facility-administered medications for this visit.         Allergies   Allergen Reactions    Aspirin Swelling    Nsaids Hives and Itching       Social History     Socioeconomic History    Marital status:      Spouse name: None    Number of children: None    Years of education: None    Highest education level: None allergies and food allergies. Neurological: Negative for dizziness and headaches. Hematological: Negative for adenopathy. Does not bruise/bleed easily. Psychiatric/Behavioral: Negative for confusion and hallucinations. All other systems reviewed and are negative. PHYSICAL EXAM:  Temp 97.6 °F (36.4 °C) (Temporal)   Ht 5' 6\" (1.676 m)   Wt (!) 311 lb (141.1 kg)   BMI 50.20 kg/m²   Physical Exam  Vitals signs and nursing note reviewed. Constitutional:       General: He is not in acute distress. Appearance: He is obese. He is not ill-appearing. HENT:      Head: Normocephalic and atraumatic. Nose: Nose normal.      Mouth/Throat:      Pharynx: Oropharynx is clear. Eyes:      General:         Right eye: No discharge. Left eye: No discharge. Extraocular Movements: Extraocular movements intact. Neck:      Musculoskeletal: Normal range of motion. Cardiovascular:      Pulses: Normal pulses. Pulmonary:      Effort: Pulmonary effort is normal. No respiratory distress. Abdominal:      General: There is no distension. Tenderness: There is no abdominal tenderness. There is no right CVA tenderness or left CVA tenderness. Musculoskeletal:         General: No swelling. Right lower leg: No edema. Left lower leg: No edema. Skin:     General: Skin is warm and dry. Neurological:      Mental Status: He is alert and oriented to person, place, and time. Mental status is at baseline.    Psychiatric:         Mood and Affect: Mood normal.         Behavior: Behavior normal.         DATA:  Results for orders placed or performed in visit on 11/06/20   POCT Urinalysis Dipstick w/ Micro (Auto)   Result Value Ref Range    Color, UA Yellow     Clarity, UA Clear Clear    Glucose, Ur neg     Bilirubin Urine 0 mg/dL    Ketones, Urine Negative     Specific Gravity, UA 1.020 1.005 - 1.030    Blood, Urine Positive     pH, UA 5.5 4.5 - 8.0    Protein, UA Negative Negative    Nitrite, Additional Contrast? Radiologist Recommendation (Urogram Protocol); Future  - Cystoscopy; Future      Orders Placed This Encounter   Procedures    CT ABDOMEN PELVIS W WO CONTRAST Additional Contrast? Radiologist Recommendation (Urogram Protocol)     prior to next visit     Standing Status:   Future     Standing Expiration Date:   11/6/2021     Scheduling Instructions:      CT for Urogram protocol     Order Specific Question:   Additional Contrast?     Answer:   Radiologist Recommendation     Comments:   Urogram Protocol    POCT Urinalysis Dipstick w/ Micro (Auto)    POC Urine with Microscopic    Cystoscopy     Next available     Standing Status:   Future     Standing Expiration Date:   11/6/2021     Scheduling Instructions:      Next available        Return for patient will call to schedule follow up. Antonio SILVESTRE, APRN - CNP    All information inputted into the note by the MA to include chief complaint, past medical history, past surgical history, medications, allergies, social and family history and review of systems has been reviewed and updated as needed by me. EMR Dragon/transcription disclaimer: Much of this document is electronic transcription/translation of spoken language to printed text. The electronic translation of spoken language may be erroneous or, at times, nonsensical words or phrases may be inadvertently transcribed.  Although I have reviewed the document for such errors, some may still exist.

## 2020-11-09 ENCOUNTER — TELEPHONE (OUTPATIENT)
Dept: UROLOGY | Age: 60
End: 2020-11-09

## 2020-11-09 NOTE — TELEPHONE ENCOUNTER
Rosa Masson called this morning and advised that he called our billing office to get a price for a Cystocopy and was advised that they need the CPT code for same. Please contact patient to discuss. Thank you.

## 2020-11-09 NOTE — TELEPHONE ENCOUNTER
Pt saw [address] last week and was not having syptoms. He called this morning and is having trouble urinating along with some burning while urinating.

## 2020-11-10 ENCOUNTER — OFFICE VISIT (OUTPATIENT)
Dept: UROLOGY | Age: 60
End: 2020-11-10

## 2020-11-10 VITALS — BODY MASS INDEX: 47.56 KG/M2 | TEMPERATURE: 97.7 F | HEIGHT: 67 IN | WEIGHT: 303 LBS

## 2020-11-10 PROCEDURE — 99214 OFFICE O/P EST MOD 30 MIN: CPT | Performed by: NURSE PRACTITIONER

## 2020-11-10 PROCEDURE — 51798 US URINE CAPACITY MEASURE: CPT | Performed by: NURSE PRACTITIONER

## 2020-11-10 RX ORDER — TAMSULOSIN HYDROCHLORIDE 0.4 MG/1
0.4 CAPSULE ORAL DAILY
Qty: 90 CAPSULE | Refills: 1 | Status: SHIPPED | OUTPATIENT
Start: 2020-11-10 | End: 2020-11-24

## 2020-11-10 ASSESSMENT — ENCOUNTER SYMPTOMS
NAUSEA: 0
VOMITING: 0
BACK PAIN: 0
EYE PAIN: 0
WHEEZING: 0
COUGH: 0
SORE THROAT: 0

## 2020-11-10 NOTE — PROGRESS NOTES
Randle Hashimoto is a 61 y.o. male who presents today   Chief Complaint   Patient presents with    Follow-up     I am here today to discuss urine decrease. Benign Prostatic Hypertrophy   This is a new problem. The current episode started 1 to 4 weeks ago (Began Saturday). The problem has been rapidly worsening since onset. Irritative symptoms do not include frequency, nocturia or urgency. Obstructive symptoms include dribbling, incomplete emptying, a slower stream and a weak stream. Obstructive symptoms do not include an intermittent stream or straining. Associated symptoms include hematuria and hesitancy. Pertinent negatives include no chills, dysuria, nausea or vomiting. Nothing aggravates the symptoms. Past treatments include nothing. He was just evaluated in our office last week for hematuria. At this time, he was experiencing no urinary symptoms. He has completed his CT at [facility]. Past Medical History:   Diagnosis Date    Hypertension        History reviewed. No pertinent surgical history. Current Outpatient Medications   Medication Sig Dispense Refill    tamsulosin (FLOMAX) 0.4 MG capsule Take 1 capsule by mouth daily 90 capsule 1    Multiple Vitamins-Minerals (CENTRUM SILVER PO) Take by mouth      hydroCHLOROthiazide (HYDRODIURIL) 25 MG tablet Take 1 tablet by mouth daily 90 tablet 1    lisinopril (PRINIVIL;ZESTRIL) 40 MG tablet Take 1 tablet by mouth once daily 90 tablet 1    Omega-3 Fatty Acids (FISH OIL) 1000 MG CAPS Take 1,000 mg by mouth      CPAP Machine MISC by Does not apply route CPAP supplies 1 each 0     No current facility-administered medications for this visit.         Allergies   Allergen Reactions    Aspirin Swelling    Nsaids Hives and Itching       Social History     Socioeconomic History    Marital status:      Spouse name: None    Number of children: None    Years of education: None    Highest education level: None   Occupational History    None Social Needs    Financial resource strain: None    Food insecurity     Worry: None     Inability: None    Transportation needs     Medical: None     Non-medical: None   Tobacco Use    Smoking status: Never Smoker    Smokeless tobacco: Never Used   Substance and Sexual Activity    Alcohol use: No    Drug use: No    Sexual activity: None   Lifestyle    Physical activity     Days per week: None     Minutes per session: None    Stress: None   Relationships    Social connections     Talks on phone: None     Gets together: None     Attends [employer] service: None     Active member of club or organization: None     Attends meetings of clubs or organizations: None     Relationship status: None    Intimate partner violence     Fear of current or ex partner: None     Emotionally abused: None     Physically abused: None     Forced sexual activity: None   Other Topics Concern    None   Social History Narrative    None       Family History   Adopted: Yes       REVIEW OF SYSTEMS:  Review of Systems   Constitutional: Negative for chills and fever. HENT: Negative for congestion and sore throat. Eyes: Negative for pain and visual disturbance. Respiratory: Negative for cough and wheezing. Cardiovascular: Negative for chest pain and palpitations. Gastrointestinal: Negative for nausea and vomiting. Endocrine: Negative for polyphagia and polyuria. Genitourinary: Positive for difficulty urinating, hematuria, hesitancy and incomplete emptying. Negative for decreased urine volume, dysuria, enuresis, flank pain, frequency, genital sores, nocturia and urgency. Musculoskeletal: Negative for back pain and neck pain. Skin: Negative for rash and wound. Allergic/Immunologic: Negative for environmental allergies and food allergies. Neurological: Negative for dizziness and headaches. Hematological: Negative for adenopathy. Does not bruise/bleed easily.    Psychiatric/Behavioral: Negative for confusion and hallucinations. All other systems reviewed and are negative. PHYSICAL EXAM:  Temp 97.7 °F (36.5 °C) (Temporal)   Ht 5' 7\" (1.702 m)   Wt (!) 303 lb (137.4 kg)   BMI 47.46 kg/m²   Physical Exam  Vitals signs and nursing note reviewed. Constitutional:       General: He is not in acute distress. Appearance: Normal appearance. He is not ill-appearing. HENT:      Head: Normocephalic and atraumatic. Nose: Nose normal.      Mouth/Throat:      Pharynx: Oropharynx is clear. Eyes:      General:         Right eye: No discharge. Left eye: No discharge. Extraocular Movements: Extraocular movements intact. Neck:      Musculoskeletal: Normal range of motion. Cardiovascular:      Pulses: Normal pulses. Pulmonary:      Effort: Pulmonary effort is normal. No respiratory distress. Abdominal:      General: There is no distension. Tenderness: There is no abdominal tenderness. There is no right CVA tenderness or left CVA tenderness. Genitourinary:     Prostate: Enlarged (40-50cc). Not tender and no nodules present (small area of induration on right side; PSA within normal limits). Musculoskeletal:         General: No swelling. Right lower leg: No edema. Left lower leg: No edema. Skin:     General: Skin is warm and dry. Neurological:      Mental Status: He is alert and oriented to person, place, and time. Mental status is at baseline. Psychiatric:         Mood and Affect: Mood normal.         Behavior: Behavior normal.       DATA:  No results found for this visit on 11/10/20. Lab Results   Component Value Date    PSA 1.62 08/28/2020    PSA 1.05 06/12/2019     No results found for: PSAFREEPCT  Bladder Scan interpretation  Estimation of residual urine via abdominal ultrasound  Residual Urine: 46 ml  Indication: Slow Stream  Position: Supine  Examination: Incremental scanning of the suprapubic area using 3 MHz transducer using copious amounts of acoustic gel.    Findings: An anechoic area was demonstrated which represented the bladder, with measurement of residual urine as noted. IMAGING:  CT urogram completed at [facility] today and revealed no hydronephrosis, nephrolithiasis or other physical abnormalities within the urinary system. 1. BPH  Patient presents with new complaints of obstructive lower urinary tract symptoms. Postvoid residual does indicate he is emptying his bladder well, although patient does not feel as though he does. Based on patient preference and severity of symptoms, we have chosen to initiate alpha blocker therapy. We discussed common side effects including but not limited to hypotension, rhinitis, ejaculatory dysfunction, headache, dizziness, acid reflux. Patient will return in 1 month for follow-up of urinary symptoms and bladder scan. - tamsulosin (FLOMAX) 0.4 MG capsule; Take 1 capsule by mouth daily  Dispense: 90 capsule; Refill: 1      No orders of the defined types were placed in this encounter. Return in about 4 weeks (around 12/8/2020). WILLIE SILVESTRE, SHARON - CNP    All information inputted into the note by the MA to include chief complaint, past medical history, past surgical history, medications, allergies, social and family history and review of systems has been reviewed and updated as needed by me. EMR Dragon/transcription disclaimer: Much of this document is electronic transcription/translation of spoken language to printed text. The electronic translation of spoken language may be erroneous or, at times, nonsensical words or phrases may be inadvertently transcribed.  Although I have reviewed the document for such errors, some may still exist.

## 2020-11-24 ENCOUNTER — OFFICE VISIT (OUTPATIENT)
Dept: UROLOGY | Age: 60
End: 2020-11-24

## 2020-11-24 VITALS
DIASTOLIC BLOOD PRESSURE: 103 MMHG | BODY MASS INDEX: 48.31 KG/M2 | WEIGHT: 300.6 LBS | HEART RATE: 113 BPM | HEIGHT: 66 IN | TEMPERATURE: 98.9 F | SYSTOLIC BLOOD PRESSURE: 156 MMHG

## 2020-11-24 PROBLEM — N40.1 BPH WITH OBSTRUCTION/LOWER URINARY TRACT SYMPTOMS: Status: ACTIVE | Noted: 2020-11-24

## 2020-11-24 PROBLEM — N13.8 BPH WITH OBSTRUCTION/LOWER URINARY TRACT SYMPTOMS: Status: ACTIVE | Noted: 2020-11-24

## 2020-11-24 LAB
BACTERIA URINE, POC: 0
BILIRUBIN URINE: 3 MG/DL
BLOOD, URINE: POSITIVE
CASTS URINE, POC: 0
CLARITY: ABNORMAL
COLOR: YELLOW
CRYSTALS URINE, POC: 0
EPI CELLS URINE, POC: 0
GLUCOSE URINE: ABNORMAL
KETONES, URINE: POSITIVE
LEUKOCYTE EST, POC: ABNORMAL
NITRITE, URINE: NEGATIVE
PH UA: 5 (ref 4.5–8)
PROTEIN UA: POSITIVE
RBC URINE, POC: 4
SPECIFIC GRAVITY UA: 1.02 (ref 1–1.03)
UROBILINOGEN, URINE: NORMAL
WBC URINE, POC: 25
YEAST URINE, POC: 0

## 2020-11-24 PROCEDURE — 51798 US URINE CAPACITY MEASURE: CPT | Performed by: NURSE PRACTITIONER

## 2020-11-24 PROCEDURE — 81001 URINALYSIS AUTO W/SCOPE: CPT | Performed by: NURSE PRACTITIONER

## 2020-11-24 PROCEDURE — 99214 OFFICE O/P EST MOD 30 MIN: CPT | Performed by: NURSE PRACTITIONER

## 2020-11-24 RX ORDER — ALFUZOSIN HYDROCHLORIDE 10 MG/1
10 TABLET, EXTENDED RELEASE ORAL DAILY
Qty: 30 TABLET | Refills: 2 | Status: SHIPPED | OUTPATIENT
Start: 2020-11-24 | End: 2021-01-30 | Stop reason: SDUPTHER

## 2020-11-24 RX ORDER — SOLIFENACIN SUCCINATE 10 MG/1
10 TABLET, FILM COATED ORAL DAILY
Qty: 30 TABLET | Refills: 2 | Status: SHIPPED | OUTPATIENT
Start: 2020-11-24 | End: 2020-12-14 | Stop reason: ALTCHOICE

## 2020-11-24 ASSESSMENT — ENCOUNTER SYMPTOMS
NAUSEA: 0
WHEEZING: 0
COUGH: 0
VOMITING: 0
EYE PAIN: 0
SORE THROAT: 0
BACK PAIN: 0

## 2020-11-24 NOTE — PROGRESS NOTES
Financial resource strain: None    Food insecurity     Worry: None     Inability: None    Transportation needs     Medical: None     Non-medical: None   Tobacco Use    Smoking status: Never Smoker    Smokeless tobacco: Never Used   Substance and Sexual Activity    Alcohol use: No    Drug use: No    Sexual activity: None   Lifestyle    Physical activity     Days per week: None     Minutes per session: None    Stress: None   Relationships    Social connections     Talks on phone: None     Gets together: None     Attends [employer] service: None     Active member of club or organization: None     Attends meetings of clubs or organizations: None     Relationship status: None    Intimate partner violence     Fear of current or ex partner: None     Emotionally abused: None     Physically abused: None     Forced sexual activity: None   Other Topics Concern    None   Social History Narrative    None       Family History   Adopted: Yes       REVIEW OF SYSTEMS:  Review of Systems   Constitutional: Negative for chills and fever. HENT: Negative for congestion and sore throat. Eyes: Negative for pain and visual disturbance. Respiratory: Negative for cough and wheezing. Cardiovascular: Negative for chest pain and palpitations. Gastrointestinal: Negative for nausea and vomiting. Endocrine: Negative for polyphagia and polyuria. Genitourinary: Positive for difficulty urinating, frequency, hematuria and urgency. Negative for decreased urine volume, dysuria, enuresis, flank pain and genital sores. Musculoskeletal: Negative for back pain and neck pain. Skin: Negative for rash and wound. Allergic/Immunologic: Negative for environmental allergies and food allergies. Neurological: Negative for dizziness and headaches. Hematological: Negative for adenopathy. Does not bruise/bleed easily. Psychiatric/Behavioral: Negative for confusion and hallucinations.    All other systems reviewed and are negative. PHYSICAL EXAM:  BP (!) 156/103   Pulse 113   Temp 98.9 °F (37.2 °C) (Temporal)   Ht 5' 6\" (1.676 m)   Wt (!) 300 lb 9.6 oz (136.4 kg)   BMI 48.52 kg/m²   Physical Exam  Vitals signs and nursing note reviewed. Constitutional:       General: He is not in acute distress. Appearance: He is obese. He is not ill-appearing. HENT:      Head: Normocephalic and atraumatic. Nose: Nose normal.      Mouth/Throat:      Pharynx: Oropharynx is clear. Eyes:      General:         Right eye: No discharge. Left eye: No discharge. Extraocular Movements: Extraocular movements intact. Neck:      Musculoskeletal: Normal range of motion. Cardiovascular:      Pulses: Normal pulses. Pulmonary:      Effort: Pulmonary effort is normal. No respiratory distress. Abdominal:      General: There is no distension. Tenderness: There is no abdominal tenderness. There is no right CVA tenderness or left CVA tenderness. Musculoskeletal:         General: No swelling. Right lower leg: No edema. Left lower leg: No edema. Skin:     General: Skin is warm and dry. Neurological:      Mental Status: He is alert and oriented to person, place, and time. Mental status is at baseline.    Psychiatric:         Mood and Affect: Mood normal.         Behavior: Behavior normal.       DATA:  Results for orders placed or performed in visit on 11/24/20   POCT Urinalysis Dipstick w/ Micro (Auto)   Result Value Ref Range    Color, UA Yellow     Clarity, UA Cloudy (A) Clear    Glucose, Ur neg     Bilirubin Urine 3 mg/dL    Ketones, Urine Positive     Specific Gravity, UA 1.025 1.005 - 1.030    Blood, Urine Positive     pH, UA 5.0 4.5 - 8.0    Protein, UA Positive (A) Negative    Nitrite, Urine Negative     Leukocytes, UA large     Urobilinogen, Urine Normal     rbc urine, poc 4     wbc urine, poc 25     bacteria urine, poc 0     yeast urine, poc 0     casts urine, poc 0     epi cells urine, poc 0

## 2020-11-25 ENCOUNTER — VIRTUAL VISIT (OUTPATIENT)
Dept: INTERNAL MEDICINE | Age: 60
End: 2020-11-25

## 2020-11-25 PROBLEM — R31.29 MICROSCOPIC HEMATURIA: Status: ACTIVE | Noted: 2020-11-25

## 2020-11-25 PROCEDURE — 99442 PR PHYS/QHP TELEPHONE EVALUATION 11-20 MIN: CPT | Performed by: NURSE PRACTITIONER

## 2020-11-25 ASSESSMENT — ENCOUNTER SYMPTOMS
VOMITING: 0
BLOOD IN STOOL: 0
ABDOMINAL DISTENTION: 0
NAUSEA: 0
EYE DISCHARGE: 0
WHEEZING: 0
EYE ITCHING: 0
ABDOMINAL PAIN: 0
SORE THROAT: 0
SHORTNESS OF BREATH: 0
COUGH: 0
DIARRHEA: 0
STRIDOR: 0
TROUBLE SWALLOWING: 0
CHOKING: 0
CONSTIPATION: 0
COLOR CHANGE: 0

## 2020-11-25 NOTE — PROGRESS NOTES
Weight 300 lb 9.6 oz 303 lb 311 lb 294 lb 293 lb 297 lb   Height 5' 6\" 5' 7\" 5' 6\" 5' 6\" 5' 6\" 5' 6\"   Body mass index 48.51 kg/m2 47.45 kg/m2 50.19 kg/m2 47.45 kg/m2 47.29 kg/m2 47.93 kg/m2       Family History   Adopted: Yes       Social History     Tobacco Use    Smoking status: Never Smoker    Smokeless tobacco: Never Used   Substance Use Topics    Alcohol use: No      Current Outpatient Medications   Medication Sig Dispense Refill    solifenacin (VESICARE) 10 MG tablet Take 1 tablet by mouth daily 30 tablet 2    alfuzosin (UROXATRAL) 10 MG extended release tablet Take 1 tablet by mouth daily 30 tablet 2    Multiple Vitamins-Minerals (CENTRUM SILVER PO) Take by mouth      hydroCHLOROthiazide (HYDRODIURIL) 25 MG tablet Take 1 tablet by mouth daily 90 tablet 1    lisinopril (PRINIVIL;ZESTRIL) 40 MG tablet Take 1 tablet by mouth once daily 90 tablet 1    Omega-3 Fatty Acids (FISH OIL) 1000 MG CAPS Take 1,000 mg by mouth      CPAP Machine MISC by Does not apply route CPAP supplies 1 each 0     No current facility-administered medications for this visit.       Allergies   Allergen Reactions    Aspirin Swelling    Nsaids Hives and Itching       Health Maintenance   Topic Date Due    DTaP/Tdap/Td vaccine (1 - Tdap) 08/13/1979    Shingles Vaccine (1 of 2) 06/18/2021 (Originally 8/13/2010)    Potassium monitoring  08/28/2021    Creatinine monitoring  08/28/2021    Colon cancer screen colonoscopy  03/08/2022    Lipid screen  08/28/2025    Flu vaccine  Completed    Hepatitis C screen  Completed    HIV screen  Completed    Hepatitis A vaccine  Aged Out    Hepatitis B vaccine  Aged Out    Hib vaccine  Aged Out    Meningococcal (ACWY) vaccine  Aged Out    Pneumococcal 0-64 years Vaccine  Aged Out       No results found for: LABA1C  Lab Results   Component Value Date    PSA 1.62 08/28/2020    PSA 1.05 06/12/2019     TSH   Date Value Ref Range Status   08/28/2020 1.740 0.270 - 4.200 uIU/mL Final ]  Lab Results   Component Value Date     08/28/2020    K 4.7 08/28/2020    CL 99 08/28/2020    CO2 29 08/28/2020    BUN 15 08/28/2020    CREATININE 0.9 08/28/2020    GLUCOSE 100 08/28/2020    CALCIUM 9.7 08/28/2020    PROT 7.4 08/28/2020    LABALBU 4.1 08/28/2020    BILITOT 0.4 08/28/2020    ALKPHOS 64 08/28/2020    AST 19 08/28/2020    ALT 25 08/28/2020    LABGLOM >60 08/28/2020    GFRAA >59 08/28/2020     Lab Results   Component Value Date    CHOL 181 08/28/2020     Lab Results   Component Value Date    TRIG 89 08/28/2020     Lab Results   Component Value Date    HDL 55 08/28/2020    HDL 28 (L) 06/17/2019     Lab Results   Component Value Date    LDLCALC 108 08/28/2020    LDLCALC 114 06/17/2019     Lab Results   Component Value Date     08/28/2020    K 4.7 08/28/2020    CL 99 08/28/2020    CO2 29 08/28/2020    BUN 15 08/28/2020    CREATININE 0.9 08/28/2020    GLUCOSE 100 08/28/2020    CALCIUM 9.7 08/28/2020      Lab Results   Component Value Date    WBC 8.2 08/28/2020    HGB 14.3 08/28/2020    HCT 45.8 08/28/2020    MCV 86.1 08/28/2020     08/28/2020    LYMPHOPCT 25.7 08/28/2020    RBC 5.32 08/28/2020    MCH 26.9 (L) 08/28/2020    MCHC 31.2 (L) 08/28/2020    RDW 13.7 08/28/2020     Lab Results   Component Value Date    VITD25 37.5 08/28/2020       Subjective:      Review of Systems   Constitutional: Negative for fatigue, fever and unexpected weight change. HENT: Negative for ear discharge, ear pain, mouth sores, sore throat and trouble swallowing. Eyes: Negative for discharge, itching and visual disturbance. Respiratory: Negative for cough, choking, shortness of breath, wheezing and stridor. Cardiovascular: Negative for chest pain, palpitations and leg swelling. Gastrointestinal: Negative for abdominal distention, abdominal pain, blood in stool, constipation, diarrhea, nausea and vomiting. Endocrine: Negative for cold intolerance, polydipsia and polyuria.    Genitourinary: and HCTZ over the weekend check your pressure and see how you are doing. I think that this will resolve the issues of your near syncope and hypotension. We may have to add a small amount of lisinopril back. You can text me over the weekend if you have issues    Electronically signed by SHARON Calderon on 11/25/2020 at 12:36 PM   tele visit 19 minutes  We are communicating with telehealth for the 3 month fu for controlled substance      Pursuant to the emergency declaration under the [facility] waiver authority and the [facility] and [employer] Act, this Virtual Visit was conducted, with patient's consent, to reduce the patient's risk of exposure to COVID-19 and provide continuity of care for an established patient.        EMRDragon/transcription disclaimer:  Much of this encounter note is electronic

## 2020-11-27 LAB
ORGANISM: ABNORMAL
URINE CULTURE, ROUTINE: ABNORMAL
URINE CULTURE, ROUTINE: ABNORMAL

## 2020-11-27 RX ORDER — SULFAMETHOXAZOLE AND TRIMETHOPRIM 800; 160 MG/1; MG/1
1 TABLET ORAL 2 TIMES DAILY
Qty: 28 TABLET | Refills: 0 | Status: SHIPPED | OUTPATIENT
Start: 2020-11-27 | End: 2020-12-11

## 2020-12-02 ENCOUNTER — OFFICE VISIT (OUTPATIENT)
Dept: INTERNAL MEDICINE | Age: 60
End: 2020-12-02

## 2020-12-02 VITALS
BODY MASS INDEX: 48.7 KG/M2 | HEART RATE: 98 BPM | HEIGHT: 66 IN | DIASTOLIC BLOOD PRESSURE: 98 MMHG | SYSTOLIC BLOOD PRESSURE: 150 MMHG | WEIGHT: 303 LBS

## 2020-12-02 DIAGNOSIS — N30.00 ACUTE CYSTITIS WITHOUT HEMATURIA: ICD-10-CM

## 2020-12-02 PROCEDURE — 99213 OFFICE O/P EST LOW 20 MIN: CPT | Performed by: NURSE PRACTITIONER

## 2020-12-02 ASSESSMENT — ENCOUNTER SYMPTOMS
VOMITING: 0
EYE ITCHING: 0
CONSTIPATION: 0
COLOR CHANGE: 0
BLOOD IN STOOL: 0
DIARRHEA: 0
ABDOMINAL PAIN: 0
ABDOMINAL DISTENTION: 0
EYE DISCHARGE: 0
TROUBLE SWALLOWING: 0
WHEEZING: 0
SORE THROAT: 0
SHORTNESS OF BREATH: 0
STRIDOR: 0
CHOKING: 0
COUGH: 0
NAUSEA: 0

## 2020-12-02 NOTE — PATIENT INSTRUCTIONS
1. MRSA UTI complete the Bactrim. I have already put a urine in for you to get done about 10 days to 2 weeks after you complete the antibiotic. We will need to see that this is completely cleared. 2.  Microscopic hematuria complete your visits with urology for the cystoscopy. 3. MRSA likely carbuncles of the scrotum and the left hip. Warm moist compresses the Bactrim that you are using for the UTI will improve these as well. Should you get any type of pimple area at all in the future do not try to pop it or open it yourself. Call me immediately so we can get you on Bactrim.   If by chance you need to call and talk to another provider you need to let them know that you have a history of MRSA and that I wanted you on Bactrim

## 2020-12-02 NOTE — PROGRESS NOTES
[address] [facility]  [address] [address] [address]  [address] [address]  Dept: [phone]  Dept Fax: [phone]  Loc: [phone]    Bobby Inman is a 61 y.o. male who presents today for his medical conditions/complaints as noted below. Bobby Inman is c/opal 6 Month Follow-Up        HPI:     HPI   #1 MRSA UTI. We referred him to urology for microscopic hematuria. I saw him in the office collected another urine and the culture grew MRSA. Ja Dewitt called me over the weekend when he saw the report on my chart and I called in [employer] for him for 14 days. He is taking that without any complications. Monday he had called urology office to let them know that he had developed a wound on his left upper thigh and to wounds in his scrotum. They advised that he would need an ultrasound but to call PCP for further info because they felt these were not related to anything that they had done. He then called me on Monday I told him to start warm soaking those areas they would more than likely be MRSA wound. And I wanted to see him in the office he came today. He said he is feeling much better just in general he is not having the urinary urgency and frequency that he was having before we started the antibiotic. He is also noticed a marked decrease in the size of the carbuncles on the scrotum and the left hip. Left hip is a little erythematous but there is not a lot of induration. The scrotal area he says is down by one half. Chief Complaint   Patient presents with    6 Month Follow-Up       Past Medical History:   Diagnosis Date    Hypertension       History reviewed. No pertinent surgical history.     Vitals 12/2/2020 11/24/2020 11/10/2020 11/6/2020 6/17/2019 2/24/3756   SYSTOLIC 363 501 - - 297 90   DIASTOLIC 98 247 - - 68 54   Pulse 98 113 - - 80 88   Temp - 98.9 97.7 97.6 97.6 99.5   Resp - - - - - 18   SpO2 - - - - 98 94   Weight 303 lb 300 lb 9.6 oz 303 lb 311 lb 294 lb 293 lb   Height 5' 6\" 5' 6\" 5' 7\" 5' 6\" 5' 6\" 5' 6\"   Body mass index 48.9 kg/m2 48.51 kg/m2 47.45 kg/m2 50.19 kg/m2 47.45 kg/m2 47.29 kg/m2       Family History   Adopted: Yes       Social History     Tobacco Use    Smoking status: Never Smoker    Smokeless tobacco: Never Used   Substance Use Topics    Alcohol use: No      Current Outpatient Medications   Medication Sig Dispense Refill    sulfamethoxazole-trimethoprim (BACTRIM DS) 800-160 MG per tablet Take 1 tablet by mouth 2 times daily for 14 days 28 tablet 0    alfuzosin (UROXATRAL) 10 MG extended release tablet Take 1 tablet by mouth daily 30 tablet 2    Multiple Vitamins-Minerals (CENTRUM SILVER PO) Take by mouth      Omega-3 Fatty Acids (FISH OIL) 1000 MG CAPS Take 1,000 mg by mouth      CPAP Machine MISC by Does not apply route CPAP supplies 1 each 0    solifenacin (VESICARE) 10 MG tablet Take 1 tablet by mouth daily (Patient not taking: Reported on 12/2/2020) 30 tablet 2    hydroCHLOROthiazide (HYDRODIURIL) 25 MG tablet Take 1 tablet by mouth daily (Patient not taking: Reported on 12/2/2020) 90 tablet 1    lisinopril (PRINIVIL;ZESTRIL) 40 MG tablet Take 1 tablet by mouth once daily (Patient not taking: Reported on 12/2/2020) 90 tablet 1     No current facility-administered medications for this visit.       Allergies   Allergen Reactions    Aspirin Swelling    Nsaids Hives and Itching       Health Maintenance   Topic Date Due    DTaP/Tdap/Td vaccine (1 - Tdap) 12/22/2020 (Originally 8/13/1979)    Shingles Vaccine (1 of 2) 06/18/2021 (Originally 8/13/2010)    Potassium monitoring  08/28/2021    Creatinine monitoring  08/28/2021    Colon cancer screen colonoscopy  03/08/2022    Lipid screen  08/28/2025    Flu vaccine  Completed    Hepatitis C screen  Completed    HIV screen  Completed    Hepatitis A vaccine  Aged Out    Hepatitis B vaccine  Aged Out    Hib vaccine  Aged Out    Meningococcal (ACWY) vaccine  Aged Out    and leg swelling. Gastrointestinal: Negative for abdominal distention, abdominal pain, blood in stool, constipation, diarrhea, nausea and vomiting. Endocrine: Negative for cold intolerance, polydipsia and polyuria. Genitourinary: Negative for difficulty urinating, dysuria, frequency and urgency. Scrotal carbuncles   Musculoskeletal: Negative for arthralgias and gait problem. Skin: Negative for color change and rash. Carbuncle left groin lower abdomen area   Allergic/Immunologic: Negative for food allergies and immunocompromised state. Neurological: Negative for dizziness, tremors, syncope, speech difficulty, weakness and headaches. Hematological: Negative for adenopathy. Does not bruise/bleed easily. Psychiatric/Behavioral: Negative for confusion and hallucinations. Objective:     Physical Exam  Constitutional:       General: He is not in acute distress. Appearance: He is well-developed. HENT:      Head: Normocephalic and atraumatic. Eyes:      General: No scleral icterus. Right eye: No discharge. Left eye: No discharge. Pupils: Pupils are equal, round, and reactive to light. Neck:      Musculoskeletal: Normal range of motion and neck supple. Thyroid: No thyromegaly. Vascular: No JVD. Cardiovascular:      Rate and Rhythm: Normal rate and regular rhythm. Heart sounds: Normal heart sounds. No murmur. Pulmonary:      Effort: Pulmonary effort is normal. No respiratory distress. Breath sounds: Normal breath sounds. No wheezing or rales. Abdominal:      General: Bowel sounds are normal. There is no distension. Palpations: Abdomen is soft. There is no mass. Tenderness: There is no abdominal tenderness. There is no guarding or rebound. Genitourinary:         Comments: 3 separate carbuncle to the scrotum and one on the left hip.   They definitely have a skin erosion so these are definitely carbuncles not a mass or anything of that nature. With the thought of knowing he has UTI MRSA these are most likely staph wounds. Musculoskeletal: Normal range of motion. General: No tenderness. Skin:     General: Skin is warm and dry. Findings: No erythema or rash. Neurological:      Mental Status: He is alert and oriented to person, place, and time. Cranial Nerves: No cranial nerve deficit. Coordination: Coordination normal.      Deep Tendon Reflexes: Reflexes are normal and symmetric. Reflexes normal.   Psychiatric:         Mood and Affect: Mood is not depressed. Behavior: Behavior normal.         Thought Content: Thought content normal.         Judgment: Judgment normal.       BP (!) 150/98   Pulse 98   Ht 5' 6\" (1.676 m)   Wt (!) 303 lb (137.4 kg)   BMI 48.91 kg/m²     Assessment:       Diagnosis Orders   1. Acute cystitis without hematuria  Urinalysis Reflex to Culture    Culture, MRSA, Screening   2. MRSA infection     3. Carbuncles       Labs reviewedfrom 11/24/220    Plan:        Patient given educational materials - see patient instructions. Discussed use, benefit, and side effects of prescribed medications. Allpatient questions answered. Pt voiced understanding. Reviewed health maintenance. Instructed to continue current medications, diet and exercise. Patient agreed with treatment plan. Follow up as directed. MEDICATIONS:  No orders of the defined types were placed in this encounter. ORDERS:  Orders Placed This Encounter   Procedures    Culture, MRSA, Screening    Urinalysis Reflex to Culture       Follow-up:  Return for keep fu appt. PATIENT INSTRUCTIONS:  Patient Instructions   1. MRSA UTI complete the Bactrim. I have already put a urine in for you to get done about 10 days to 2 weeks after you complete the antibiotic. We will need to see that this is completely cleared. 2.  Microscopic hematuria complete your visits with urology for the cystoscopy. 3.   MRSA likely carbuncles of the scrotum and the left hip. Warm moist compresses the Bactrim that you are using for the UTI will improve these as well. Should you get any type of pimple area at all in the future do not try to pop it or open it yourself. Call me immediately so we can get you on Bactrim. If by chance you need to call and talk to another provider you need to let them know that you have a history of MRSA and that I wanted you on Bactrim    Electronically signed by SHARON Caro on 12/2/2020 at 1:04 PM    EMRDragon/transcription disclaimer:  Much of this encounter note is electronic transcription/translation of spoken language to printed texts. The electronic translation of spoken language may be erroneous, or at times,nonsensical words or phrases may be inadvertently transcribed.   Although I have reviewed the note for such errors, some may still exist.

## 2020-12-03 LAB — MRSA CULTURE ONLY: NORMAL

## 2020-12-14 ENCOUNTER — PROCEDURE VISIT (OUTPATIENT)
Dept: UROLOGY | Age: 60
End: 2020-12-14

## 2020-12-14 VITALS — TEMPERATURE: 98.4 F | BODY MASS INDEX: 48.7 KG/M2 | HEIGHT: 66 IN | WEIGHT: 303 LBS

## 2020-12-14 LAB
BACTERIA URINE, POC: ABNORMAL
BILIRUBIN URINE: 1 MG/DL
BLOOD, URINE: POSITIVE
CASTS URINE, POC: ABNORMAL
CLARITY: CLEAR
COLOR: YELLOW
CRYSTALS URINE, POC: ABNORMAL
EPI CELLS URINE, POC: 2
GLUCOSE URINE: ABNORMAL
KETONES, URINE: NEGATIVE
LEUKOCYTE EST, POC: ABNORMAL
NITRITE, URINE: NEGATIVE
PH UA: 5 (ref 4.5–8)
PROTEIN UA: NEGATIVE
RBC URINE, POC: 10
SPECIFIC GRAVITY UA: 1.03 (ref 1–1.03)
UROBILINOGEN, URINE: NORMAL
WBC URINE, POC: ABNORMAL
YEAST URINE, POC: ABNORMAL

## 2020-12-14 PROCEDURE — 81000 URINALYSIS NONAUTO W/SCOPE: CPT | Performed by: UROLOGY

## 2020-12-14 PROCEDURE — 52000 CYSTOURETHROSCOPY: CPT | Performed by: UROLOGY

## 2020-12-14 NOTE — PROGRESS NOTES
stable      DATA:  CMP:    Lab Results   Component Value Date     08/28/2020    K 4.7 08/28/2020    CL 99 08/28/2020    CO2 29 08/28/2020    BUN 15 08/28/2020    CREATININE 0.9 08/28/2020    GFRAA >59 08/28/2020    LABGLOM >60 08/28/2020    GLUCOSE 100 08/28/2020    PROT 7.4 08/28/2020    LABALBU 4.1 08/28/2020    CALCIUM 9.7 08/28/2020    BILITOT 0.4 08/28/2020    ALKPHOS 64 08/28/2020    AST 19 08/28/2020    ALT 25 08/28/2020     Results for orders placed or performed in visit on 12/14/20   POC URINE with Microscopic   Result Value Ref Range    Color, UA Yellow     Clarity, UA Clear Clear    Glucose, Ur neg     Bilirubin Urine 1 (A) mg/dL    Ketones, Urine Negative     Specific Gravity, UA 1.030 1.005 - 1.030    Blood, Urine Positive (A)     pH, UA 5.0 4.5 - 8.0    Protein, UA Negative Negative    Nitrite, Urine Negative     Leukocytes, UA neg     Urobilinogen, Urine Normal     rbc urine, poc 10 (A)     wbc urine, poc      bacteria urine, poc      yeast urine, poc      casts urine, poc      epi cells urine, poc 2 (A)     crystals urine, poc                 IMAGING:  [facility] do not have the films the report says there was no abnormality seen. Normal kidneys no stones no hydronephrosis no solid-appearing masses    1. BPH with obstruction/lower urinary tract symptoms  His symptoms are improved on alpha-blocker and Vesicare will need to watch his postvoid residuals carefully have him return to see Lubna Barlow in 6 weeks for check PVR. Otherwise continue current medications. I thought his prostate looked fairly normal cystoscopy lungs he is symptoms are improved I see no need for surgical intervention at this point  - POC URINE with Microscopic    2. Microscopic hematuria  Normal-appearing bladder by report upper tracts normal by CT imaging. Would monitor. Did not really identify a true source by cystoscopy today.   As per guidelines will monitor if this remains

## 2020-12-30 RX ORDER — CEFDINIR 300 MG/1
300 CAPSULE ORAL 2 TIMES DAILY
Qty: 14 CAPSULE | Refills: 0 | Status: SHIPPED | OUTPATIENT
Start: 2020-12-30 | End: 2021-01-06

## 2021-01-28 ENCOUNTER — TELEPHONE (OUTPATIENT)
Dept: UROLOGY | Age: 61
End: 2021-01-28

## 2021-01-30 DIAGNOSIS — N40.1 BPH WITH OBSTRUCTION/LOWER URINARY TRACT SYMPTOMS: ICD-10-CM

## 2021-01-30 DIAGNOSIS — N13.8 BPH WITH OBSTRUCTION/LOWER URINARY TRACT SYMPTOMS: ICD-10-CM

## 2021-02-01 RX ORDER — ALFUZOSIN HYDROCHLORIDE 10 MG/1
10 TABLET, EXTENDED RELEASE ORAL DAILY
Qty: 30 TABLET | Refills: 2 | Status: SHIPPED | OUTPATIENT
Start: 2021-02-01 | End: 2021-03-03 | Stop reason: SDUPTHER

## 2021-02-02 DIAGNOSIS — R30.0 DYSURIA: Primary | ICD-10-CM

## 2021-02-05 DIAGNOSIS — R30.0 DYSURIA: ICD-10-CM

## 2021-02-05 LAB
BACTERIA: NEGATIVE /HPF
BILIRUBIN URINE: NEGATIVE
BLOOD, URINE: ABNORMAL
CLARITY: CLEAR
COLOR: YELLOW
CRYSTALS, UA: ABNORMAL /HPF
EPITHELIAL CELLS, UA: 1 /HPF (ref 0–5)
GLUCOSE URINE: NEGATIVE MG/DL
HYALINE CASTS: 0 /HPF (ref 0–8)
KETONES, URINE: NEGATIVE MG/DL
LEUKOCYTE ESTERASE, URINE: NEGATIVE
NITRITE, URINE: NEGATIVE
PH UA: 6.5 (ref 5–8)
PROTEIN UA: NEGATIVE MG/DL
RBC UA: 8 /HPF (ref 0–4)
SPECIFIC GRAVITY UA: 1.02 (ref 1–1.03)
UROBILINOGEN, URINE: 0.2 E.U./DL
WBC UA: 2 /HPF (ref 0–5)

## 2021-02-08 ENCOUNTER — VIRTUAL VISIT (OUTPATIENT)
Dept: INTERNAL MEDICINE | Age: 61
End: 2021-02-08

## 2021-02-08 DIAGNOSIS — A49.02 MRSA INFECTION: ICD-10-CM

## 2021-02-08 DIAGNOSIS — L72.9 SCROTAL CYST: Primary | ICD-10-CM

## 2021-02-08 PROCEDURE — 99213 OFFICE O/P EST LOW 20 MIN: CPT | Performed by: NURSE PRACTITIONER

## 2021-02-08 ASSESSMENT — ENCOUNTER SYMPTOMS
BLOOD IN STOOL: 0
STRIDOR: 0
COLOR CHANGE: 0
EYE DISCHARGE: 0
CHOKING: 0
VOMITING: 0
SORE THROAT: 0
ABDOMINAL DISTENTION: 0
SHORTNESS OF BREATH: 0
NAUSEA: 0
EYE ITCHING: 0
ABDOMINAL PAIN: 0
DIARRHEA: 0
COUGH: 0
WHEEZING: 0
CONSTIPATION: 0
TROUBLE SWALLOWING: 0

## 2021-02-08 NOTE — PATIENT INSTRUCTIONS
1.  Scrotal cyst;   2. History of MRSA;   Urine completed and finish bactrim;     Warm sitz baths or warm moist compresses to the scrotal area;

## 2021-02-08 NOTE — PROGRESS NOTES
[facility] INTERNAL MEDICINE  [address] [facility] [phone] [address]  Dept: [phone]  Dept Fax: [phone]: Nunu Martinez (:  1960) is a 61 y.o. male,Established patient, here for evaluation of the following chief complaint(s): No chief complaint on file. Chelsi Rios is a 61 y.o. male who were are performing tele health communication  for his medical conditions/complaints as noted below. Currently, our state is under umbrella of national state of emergency and we are using alternative methods of taking care of the needs of our patients so they are not exposed in our office; The patient has consented to this form of communication  Chelsi Rios is c/opal   No chief complaint on file. THE patient is at home   Achilles Ayesha is at office   Others in attendance are     HPI:     HPI   1. Scrotal cyst   That is recurrent; He has had MRSA lesion in the past in the scrotal area; We had gotten a urine this weekend that was clear of infection; He has been using sitz bath and we started Bactrim ; he thinks it is smaller today; No chief complaint on file. Past Medical History:   Diagnosis Date    Hypertension       No past surgical history on file.     Vitals 2020 2020 2020 11/10/2020 2020    SYSTOLIC - 586 640 - - 992   DIASTOLIC - 98 013 - - 68   Pulse - 98 113 - - 80   Temp 98.4 - 98.9 97.7 97.6 97.6   Resp - - - - - -   SpO2 - - - - - 98   Weight 303 lb 303 lb 300 lb 9.6 oz 303 lb 311 lb 294 lb   Height 5' 6\" 5' 6\" 5' 6\" 5' 7\" 5' 6\" 5' 6\"   Body mass index 48.9 kg/m2 48.9 kg/m2 48.51 kg/m2 47.45 kg/m2 50.19 kg/m2 47.45 kg/m2       Family History   Adopted: Yes       Social History     Tobacco Use    Smoking status: Never Smoker    Smokeless tobacco: Never Used   Substance Use Topics    Alcohol use: No      Current Outpatient Medications   Medication Sig Dispense Refill Lab Results   Component Value Date     08/28/2020    K 4.7 08/28/2020    CL 99 08/28/2020    CO2 29 08/28/2020    BUN 15 08/28/2020    CREATININE 0.9 08/28/2020    GLUCOSE 100 08/28/2020    CALCIUM 9.7 08/28/2020      Lab Results   Component Value Date    WBC 8.2 08/28/2020    HGB 14.3 08/28/2020    HCT 45.8 08/28/2020    MCV 86.1 08/28/2020     08/28/2020    LYMPHOPCT 25.7 08/28/2020    RBC 5.32 08/28/2020    MCH 26.9 (L) 08/28/2020    MCHC 31.2 (L) 08/28/2020    RDW 13.7 08/28/2020     Lab Results   Component Value Date    VITD25 37.5 08/28/2020       Subjective:      Review of Systems   Constitutional: Negative for fatigue, fever and unexpected weight change. HENT: Negative for ear discharge, ear pain, mouth sores, sore throat and trouble swallowing. Eyes: Negative for discharge, itching and visual disturbance. Respiratory: Negative for cough, choking, shortness of breath, wheezing and stridor. Cardiovascular: Negative for chest pain, palpitations and leg swelling. Gastrointestinal: Negative for abdominal distention, abdominal pain, blood in stool, constipation, diarrhea, nausea and vomiting. Endocrine: Negative for cold intolerance, polydipsia and polyuria. Genitourinary: Negative for difficulty urinating, dysuria, frequency and urgency. Scrotal cyst   Musculoskeletal: Negative for arthralgias and gait problem. Skin: Negative for color change and rash. Allergic/Immunologic: Negative for food allergies and immunocompromised state. Neurological: Negative for dizziness, tremors, syncope, speech difficulty, weakness and headaches. Hematological: Negative for adenopathy. Does not bruise/bleed easily. Psychiatric/Behavioral: Negative for confusion and hallucinations.        Objective:     Physical Exam   DGEVISITPE      GENERAL:   Afebrile alert no acute distress  Respiratory no use of accessory muscles no acute distress no audible wheezing Mental status alert oriented adequate thought processes        Vital signs were not taken at this visit as no equipment was available; There were no vitals taken for this visit. Assessment:       Diagnosis Orders   1. Scrotal cyst     2. MRSA infection       Labs reviewedfrom 2/5/2021    Plan:        Patient given educational materials - see patient instructions. Discussed use, benefit, and side effects of prescribed medications. Allpatient questions answered. Pt voiced understanding. Reviewed health maintenance. Instructed to continue current medications, diet and exercise. Patient agreed with treatment plan. Follow up as directed. MEDICATIONS:  No orders of the defined types were placed in this encounter. ORDERS:  No orders of the defined types were placed in this encounter. Follow-up:  No follow-ups on file. Following the remote visit today we will call you when we are available to reschedule your follow up appt      PATIENT INSTRUCTIONS:  Patient Instructions   1. Scrotal cyst;   2. History of MRSA;   Urine completed and finish bactrim; Warm sitz baths or warm moist compresses to the scrotal area;      Electronically signed by SHARON Peck on 2/8/2021 at 11:04 AM  Doxy visit   We are communicating with telehealth for the 3 month fu for controlled substance      Pursuant to the emergency declaration under the [address] [facility], [address] waiver authority and the [employer] Act, this Virtual Visit was conducted, with patient's consent, to reduce the patient's risk of exposure to COVID-19 and provide continuity of care for an established patient.        EMRDragon/transcription disclaimer:  Much of this encounter note is electronic Tommy Medina is a 61 y.o. male being evaluated by a Virtual Visit (video visit) encounter to address concerns as mentioned above. A caregiver was present when appropriate. Due to this being a TeleHealth encounter (During [facility] public [facility] emergency), evaluation of the following organ systems was limited: Vitals/Constitutional/EENT/Resp/CV/GI//MS/Neuro/Skin/Heme-Lymph-Imm. Pursuant to the emergency declaration under the [address] [place] [address] authority and the [employer] and [employer] Act, this Virtual Visit was conducted with patient's (and/or legal guardian's) consent, to reduce the patient's risk of exposure to COVID-19 and provide necessary medical care. The patient (and/or legal guardian) has also been advised to contact this office for worsening conditions or problems, and seek emergency medical treatment and/or call 911 if deemed necessary.      Patient identification was verified at the start of the visit: Yes

## 2021-03-03 ENCOUNTER — TELEMEDICINE (OUTPATIENT)
Dept: UROLOGY | Age: 61
End: 2021-03-03

## 2021-03-03 DIAGNOSIS — N13.8 BPH WITH OBSTRUCTION/LOWER URINARY TRACT SYMPTOMS: Primary | ICD-10-CM

## 2021-03-03 DIAGNOSIS — R31.29 MICROSCOPIC HEMATURIA: ICD-10-CM

## 2021-03-03 DIAGNOSIS — N40.1 BPH WITH OBSTRUCTION/LOWER URINARY TRACT SYMPTOMS: Primary | ICD-10-CM

## 2021-03-03 PROCEDURE — 99214 OFFICE O/P EST MOD 30 MIN: CPT | Performed by: NURSE PRACTITIONER

## 2021-03-03 RX ORDER — ALFUZOSIN HYDROCHLORIDE 10 MG/1
10 TABLET, EXTENDED RELEASE ORAL DAILY
Qty: 90 TABLET | Refills: 3 | Status: SHIPPED | OUTPATIENT
Start: 2021-03-03 | End: 2021-05-20 | Stop reason: SDUPTHER

## 2021-03-03 ASSESSMENT — ENCOUNTER SYMPTOMS
COUGH: 0
BACK PAIN: 0
EYE REDNESS: 0
ABDOMINAL PAIN: 0
DIARRHEA: 0
ABDOMINAL DISTENTION: 0
EYE DISCHARGE: 0
VOMITING: 0
SHORTNESS OF BREATH: 0
CONSTIPATION: 0
NAUSEA: 0
TROUBLE SWALLOWING: 0

## 2021-03-03 NOTE — PROGRESS NOTES
Raisa Amato (:  1960) is a 61 y.o. male,Established patient, here for evaluation of the following chief complaint(s): Follow-up (I am having my virtual visit today for my 6 week follow up.)      ASSESSMENT/PLAN:  1. BPH with obstruction/lower urinary tract symptoms  Patient is doing well with alfuzosin. He reports his symptoms are significantly decreased. Unfortunately, as this was a MyChart visit, I am unable to assess post void residual.  He will continue on with alfuzosin and we will plan for follow-up in 1 year with a PSA prior.    -     PSA, Diagnostic; Future  -     alfuzosin (UROXATRAL) 10 MG extended release tablet; Take 1 tablet by mouth daily, Disp-90 tablet, R-3Normal  2. Microscopic hematuria  Patient also had negative microscopic hematuria work-up at the end of . We will continue to monitor annually and reconsider work-up in 5 years if hematuria is persistent per AUA guidelines. Return in about 1 year (around 3/3/2022) for PSA prior. SUBJECTIVE/OBJECTIVE:  HPI  Patient presents for follow-up of his urinary symptoms. He complains of lower urinary tract symptoms including slow and weak stream, frequency, urgency, feeling of incomplete emptying. He reports these have significantly improved with alpha-blocker therapy. He is currently on alfuzosin secondary to a reaction to tamsulosin. He was also referred to our office for microscopic hematuria. He underwent hematuria work-up which was essentially negative. Cystoscopy was completed on 2020 which did reveal some strictures and scarring mid pendulous and distal bulbar urethra. These were mild and the cystoscope was able to proceed without issue. There was also 1+ trabeculation of the bladder and 1+ mild prostatic hyperplasia. Review of Systems   Constitutional: Negative for chills, fatigue and fever. HENT: Negative for congestion, ear pain and trouble swallowing. Eyes: Negative for discharge and redness. Respiratory: Negative for cough and shortness of breath. Cardiovascular: Negative for chest pain. Gastrointestinal: Negative for abdominal distention, abdominal pain, constipation, diarrhea, nausea and vomiting. Genitourinary: Negative for difficulty urinating, dysuria, flank pain, frequency, hematuria and urgency. Musculoskeletal: Negative for back pain and gait problem. Skin: Negative for pallor and wound. Neurological: Negative for dizziness and weakness. Psychiatric/Behavioral: Negative for agitation and confusion. No flowsheet data found.      Physical Exam    [INSTRUCTIONS:  \"[x]\" Indicates a positive item  \"[]\" Indicates a negative item  -- DELETE ALL ITEMS NOT EXAMINED]    Constitutional: [x] Appears well-developed and well-nourished [x] No apparent distress      [] Abnormal -     Mental status: [x] Alert and awake  [x] Oriented to person/place/time [x] Able to follow commands    [] Abnormal -     Eyes:   EOM    [x]  Normal    [] Abnormal -   Sclera  [x]  Normal    [] Abnormal -          Discharge [x]  None visible   [] Abnormal -     HENT: [x] Normocephalic, atraumatic  [] Abnormal -   [x] Mouth/Throat: Mucous membranes are moist    External Ears [x] Normal  [] Abnormal -    Neck: [x] No visualized mass [] Abnormal -     Pulmonary/Chest: [x] Respiratory effort normal   [x] No visualized signs of difficulty breathing or respiratory distress        [] Abnormal -      Musculoskeletal:   [x] Normal gait with no signs of ataxia         [x] Normal range of motion of neck        [] Abnormal -     Neurological:        [x] No Facial Asymmetry (Cranial nerve 7 motor function) (limited exam due to video visit)          [x] No gaze palsy        [] Abnormal -          Skin:        [x] No significant exanthematous lesions or discoloration noted on facial skin         [] Abnormal -            Psychiatric:       [x] Normal Affect [] Abnormal -        [x] No Hallucinations

## 2021-04-06 ENCOUNTER — VIRTUAL VISIT (OUTPATIENT)
Dept: INTERNAL MEDICINE | Age: 61
End: 2021-04-06

## 2021-04-06 DIAGNOSIS — J02.9 SORE THROAT: Primary | ICD-10-CM

## 2021-04-06 DIAGNOSIS — J01.10 ACUTE FRONTAL SINUSITIS, RECURRENCE NOT SPECIFIED: ICD-10-CM

## 2021-04-06 PROCEDURE — 99213 OFFICE O/P EST LOW 20 MIN: CPT | Performed by: NURSE PRACTITIONER

## 2021-04-06 RX ORDER — METHYLPREDNISOLONE 4 MG/1
TABLET ORAL
Qty: 1 KIT | Refills: 0 | Status: SHIPPED | OUTPATIENT
Start: 2021-04-06 | End: 2021-04-12

## 2021-04-06 RX ORDER — CEFDINIR 300 MG/1
300 CAPSULE ORAL 2 TIMES DAILY
Qty: 14 CAPSULE | Refills: 0 | Status: SHIPPED | OUTPATIENT
Start: 2021-04-06 | End: 2021-04-13

## 2021-04-06 RX ORDER — LISINOPRIL 40 MG/1
40 TABLET ORAL DAILY
COMMUNITY
Start: 2021-03-04 | End: 2021-06-21 | Stop reason: SDUPTHER

## 2021-04-06 ASSESSMENT — ENCOUNTER SYMPTOMS
STRIDOR: 0
ABDOMINAL PAIN: 0
DIARRHEA: 0
SORE THROAT: 1
COLOR CHANGE: 0
EYE DISCHARGE: 0
TROUBLE SWALLOWING: 0
VOMITING: 0
EYE ITCHING: 0
NAUSEA: 0
WHEEZING: 0
CHOKING: 0
ABDOMINAL DISTENTION: 0
COUGH: 1
BLOOD IN STOOL: 0
CONSTIPATION: 0
SHORTNESS OF BREATH: 0

## 2021-04-06 NOTE — PROGRESS NOTES
(OMNICEF) 300 MG capsule Take 1 capsule by mouth 2 times daily for 7 days 14 capsule 0    methylPREDNISolone (MEDROL DOSEPACK) 4 MG tablet Take by mouth. 1 kit 0    alfuzosin (UROXATRAL) 10 MG extended release tablet Take 1 tablet by mouth daily 90 tablet 3    Multiple Vitamins-Minerals (CENTRUM SILVER PO) Take by mouth      Omega-3 Fatty Acids (FISH OIL) 1000 MG CAPS Take 1,000 mg by mouth      CPAP Machine MISC by Does not apply route CPAP supplies 1 each 0     No current facility-administered medications for this visit.       Allergies   Allergen Reactions    Aspirin Swelling    Nsaids Hives and Itching       Health Maintenance   Topic Date Due    COVID-19 Vaccine (1) Never done    DTaP/Tdap/Td vaccine (1 - Tdap) Never done    Shingles Vaccine (1 of 2) 06/18/2021 (Originally 8/13/2010)    Potassium monitoring  08/28/2021    Creatinine monitoring  08/28/2021    Colon cancer screen colonoscopy  03/08/2022    Lipid screen  08/28/2025    Flu vaccine  Completed    Hepatitis C screen  Completed    HIV screen  Completed    Hepatitis A vaccine  Aged Out    Hepatitis B vaccine  Aged Out    Hib vaccine  Aged Out    Meningococcal (ACWY) vaccine  Aged Out    Pneumococcal 0-64 years Vaccine  Aged Out       No results found for: LABA1C  Lab Results   Component Value Date    PSA 1.62 08/28/2020    PSA 1.05 06/12/2019     TSH   Date Value Ref Range Status   08/28/2020 1.740 0.270 - 4.200 uIU/mL Final   ]  Lab Results   Component Value Date     08/28/2020    K 4.7 08/28/2020    CL 99 08/28/2020    CO2 29 08/28/2020    BUN 15 08/28/2020    CREATININE 0.9 08/28/2020    GLUCOSE 100 08/28/2020    CALCIUM 9.7 08/28/2020    PROT 7.4 08/28/2020    LABALBU 4.1 08/28/2020    BILITOT 0.4 08/28/2020    ALKPHOS 64 08/28/2020    AST 19 08/28/2020    ALT 25 08/28/2020    LABGLOM >60 08/28/2020    GFRAA >59 08/28/2020     Lab Results   Component Value Date    CHOL 181 08/28/2020     Lab Results   Component Value Date TRIG 89 08/28/2020     Lab Results   Component Value Date    HDL 55 08/28/2020    HDL 28 (L) 06/17/2019     Lab Results   Component Value Date    LDLCALC 108 08/28/2020    LDLCALC 114 06/17/2019     Lab Results   Component Value Date     08/28/2020    K 4.7 08/28/2020    CL 99 08/28/2020    CO2 29 08/28/2020    BUN 15 08/28/2020    CREATININE 0.9 08/28/2020    GLUCOSE 100 08/28/2020    CALCIUM 9.7 08/28/2020      Lab Results   Component Value Date    WBC 8.2 08/28/2020    HGB 14.3 08/28/2020    HCT 45.8 08/28/2020    MCV 86.1 08/28/2020     08/28/2020    LYMPHOPCT 25.7 08/28/2020    RBC 5.32 08/28/2020    MCH 26.9 (L) 08/28/2020    MCHC 31.2 (L) 08/28/2020    RDW 13.7 08/28/2020     Lab Results   Component Value Date    VITD25 37.5 08/28/2020       Subjective:      Review of Systems   Constitutional: Negative for fatigue, fever and unexpected weight change. HENT: Positive for sore throat. Negative for ear discharge, ear pain, mouth sores and trouble swallowing. Eyes: Negative for discharge, itching and visual disturbance. Respiratory: Positive for cough. Negative for choking, shortness of breath, wheezing and stridor. Cardiovascular: Negative for chest pain, palpitations and leg swelling. Gastrointestinal: Negative for abdominal distention, abdominal pain, blood in stool, constipation, diarrhea, nausea and vomiting. Endocrine: Negative for cold intolerance, polydipsia and polyuria. Genitourinary: Negative for difficulty urinating, dysuria, frequency and urgency. Musculoskeletal: Negative for arthralgias and gait problem. Skin: Negative for color change and rash. Allergic/Immunologic: Negative for food allergies and immunocompromised state. Neurological: Negative for dizziness, tremors, syncope, speech difficulty, weakness and headaches. Hematological: Negative for adenopathy. Does not bruise/bleed easily. Psychiatric/Behavioral: Negative for confusion and hallucinations. Objective:     Physical Exam   Constitutional  well developed, well nourished. No diaphoresis or acute distress. Neck- ROM appears normal  Extremities -No cyanosis, no edema  Pulmonary  effort appears normal.  No respiratory distress. No accessory muscle use  Neurologic  alert and oriented X 3. Cranial Nerves II-XII grossly intact  Skin color is good;  no sign of dehydration   Psychiatric  mood, affect, and behavior appear normal.  Judgment and thought processes appear normal.   Vital signs were not taken at this visit as no equipment was available; There were no vitals taken for this visit. Assessment:       Diagnosis Orders   1. Sore throat     2. Acute frontal sinusitis, recurrence not specified         Plan:        Patient given educational materials - see patient instructions. Discussed use, benefit, and side effects of prescribed medications. Allpatient questions answered. Pt voiced understanding. Reviewed health maintenance. Instructed to continue current medications, diet and exercise. Patient agreed with treatment plan. Follow up as directed. MEDICATIONS:  Orders Placed This Encounter   Medications    cefdinir (OMNICEF) 300 MG capsule     Sig: Take 1 capsule by mouth 2 times daily for 7 days     Dispense:  14 capsule     Refill:  0    methylPREDNISolone (MEDROL DOSEPACK) 4 MG tablet     Sig: Take by mouth. Dispense:  1 kit     Refill:  0         ORDERS:  No orders of the defined types were placed in this encounter. Follow-up:  No follow-ups on file. Following the remote visit today we will call you when we are available to reschedule your follow up appt      PATIENT INSTRUCTIONS:  There are no Patient Instructions on file for this visit.   Electronically signed by Fleet Prader, APRN on 4/6/2021 at 2:21 PM   doxy visit   We are communicating with telehealth for the 3 month fu for controlled substance      Pursuant to the emergency declaration under the [address] and the [address] and the Coron[employer] Preparedness and [employer] Act, this Virtual Visit was conducted, with patient's consent, to reduce the patient's risk of exposure to COVID-19 and provide continuity of care for an established patient. EMRDragon/transcription disclaimer:  Much of this encounter note is electronic    Julio César Phillips is a 61 y.o. male being evaluated by a Virtual Visit (video visit) encounter to address concerns as mentioned above. A caregiver was present when appropriate. Due to this being a TeleHealth encounter (During [facility]-59 public health emergency), evaluation of the following organ systems was limited: Vitals/Constitutional/EENT/Resp/CV/GI//MS/Neuro/Skin/Heme-Lymph-Imm. Pursuant to the emergency declaration under the [address] [place] [address] and the [employer] and [employer] Act, this Virtual Visit was conducted with patient's (and/or legal guardian's) consent, to reduce the patient's risk of exposure to COVID-19 and provide necessary medical care. The patient (and/or legal guardian) has also been advised to contact this office for worsening conditions or problems, and seek emergency medical treatment and/or call 911 if deemed necessary.      Patient identification was verified at the start of the visit: Yes

## 2021-05-19 ENCOUNTER — PATIENT MESSAGE (OUTPATIENT)
Dept: UROLOGY | Age: 61
End: 2021-05-19

## 2021-05-19 DIAGNOSIS — N40.1 BPH WITH OBSTRUCTION/LOWER URINARY TRACT SYMPTOMS: ICD-10-CM

## 2021-05-19 DIAGNOSIS — N13.8 BPH WITH OBSTRUCTION/LOWER URINARY TRACT SYMPTOMS: ICD-10-CM

## 2021-05-20 RX ORDER — ALFUZOSIN HYDROCHLORIDE 10 MG/1
10 TABLET, EXTENDED RELEASE ORAL DAILY
Qty: 90 TABLET | Refills: 3 | Status: SHIPPED | OUTPATIENT
Start: 2021-05-20 | End: 2022-02-24

## 2021-05-20 NOTE — TELEPHONE ENCOUNTER
From: Lynnette Jimenes  To: SHARON Starr - CNP  Sent: 5/19/2021 7:13 PM CDT  Subject: Prescription Question    Hi Brittanie, I need a refill on Alfuzosin. Would it be possible to get 90 day refills? I use [facility] in [facility] [place].  Thank you, Meño Beckford birthday 1960

## 2021-06-21 ENCOUNTER — PATIENT MESSAGE (OUTPATIENT)
Dept: INTERNAL MEDICINE | Age: 61
End: 2021-06-21

## 2021-06-21 RX ORDER — HYDROCHLOROTHIAZIDE 25 MG/1
25 TABLET ORAL DAILY
Qty: 30 TABLET | Refills: 1 | Status: SHIPPED | OUTPATIENT
Start: 2021-06-21 | End: 2022-04-04 | Stop reason: SDUPTHER

## 2021-06-21 RX ORDER — LISINOPRIL 40 MG/1
40 TABLET ORAL DAILY
Qty: 30 TABLET | Refills: 1 | Status: CANCELLED | OUTPATIENT
Start: 2021-06-21 | End: 2021-08-20

## 2021-06-21 RX ORDER — LISINOPRIL 40 MG/1
40 TABLET ORAL DAILY
Qty: 30 TABLET | Refills: 1 | Status: SHIPPED | OUTPATIENT
Start: 2021-06-21 | End: 2021-12-27 | Stop reason: SDUPTHER

## 2021-06-21 RX ORDER — HYDROCHLOROTHIAZIDE 25 MG/1
25 TABLET ORAL DAILY
Qty: 30 TABLET | Refills: 1 | Status: SHIPPED | OUTPATIENT
Start: 2021-06-21 | End: 2021-08-02

## 2021-06-21 NOTE — TELEPHONE ENCOUNTER
From: Patito Agarwal  To: Arloa Babinski, APRN  Sent: 6/21/2021 10:41 AM CDT  Subject: Prescription Question    When you have time could I please get some refills on Lisinopril and HCTZ. My pharmacy is [facility]. Call if you have any questions.  Thanks [employer]

## 2021-07-28 ENCOUNTER — PATIENT MESSAGE (OUTPATIENT)
Dept: INTERNAL MEDICINE | Age: 61
End: 2021-07-28

## 2021-07-28 DIAGNOSIS — Z00.00 WELL ADULT EXAM: Primary | ICD-10-CM

## 2021-07-28 NOTE — TELEPHONE ENCOUNTER
From: Aline Aguilar  To: SHARON Vernon  Sent: 7/28/2021 9:13 AM CDT  Subject: Non-Urgent Medical Question    Good Morning, I have an appointment on 8/17. Would you please put an order in for my lab work. I think there is an older one in there but I'm not sure it checks for everything. You may want to delete it to save confusion. Please let me know if I need to fast for it. Call me if you have any questions.  Thanks Annia Canela [phone]

## 2021-08-02 ENCOUNTER — VIRTUAL VISIT (OUTPATIENT)
Dept: INTERNAL MEDICINE | Age: 61
End: 2021-08-02

## 2021-08-02 DIAGNOSIS — N41.0 ACUTE PROSTATITIS: Primary | ICD-10-CM

## 2021-08-02 DIAGNOSIS — Z00.00 WELL ADULT EXAM: ICD-10-CM

## 2021-08-02 LAB
ALBUMIN SERPL-MCNC: 4.1 G/DL (ref 3.5–5.2)
ALP BLD-CCNC: 73 U/L (ref 40–130)
ALT SERPL-CCNC: 42 U/L (ref 5–41)
ANION GAP SERPL CALCULATED.3IONS-SCNC: 10 MMOL/L (ref 7–19)
AST SERPL-CCNC: 33 U/L (ref 5–40)
BACTERIA: ABNORMAL /HPF
BILIRUB SERPL-MCNC: 0.5 MG/DL (ref 0.2–1.2)
BILIRUBIN URINE: NEGATIVE
BLOOD, URINE: ABNORMAL
BUN BLDV-MCNC: 16 MG/DL (ref 8–23)
CALCIUM SERPL-MCNC: 9.6 MG/DL (ref 8.8–10.2)
CHLORIDE BLD-SCNC: 94 MMOL/L (ref 98–111)
CHOLESTEROL, TOTAL: 186 MG/DL (ref 160–199)
CLARITY: CLEAR
CO2: 34 MMOL/L (ref 22–29)
COLOR: YELLOW
CREAT SERPL-MCNC: 1 MG/DL (ref 0.5–1.2)
EPITHELIAL CELLS, UA: 0 /HPF (ref 0–5)
GFR AFRICAN AMERICAN: >59
GFR NON-AFRICAN AMERICAN: >60
GLUCOSE BLD-MCNC: 105 MG/DL (ref 74–109)
GLUCOSE URINE: NEGATIVE MG/DL
HDLC SERPL-MCNC: 31 MG/DL (ref 55–121)
HYALINE CASTS: 1 /HPF (ref 0–8)
KETONES, URINE: NEGATIVE MG/DL
LDL CHOLESTEROL CALCULATED: 126 MG/DL
LEUKOCYTE ESTERASE, URINE: ABNORMAL
NITRITE, URINE: NEGATIVE
PH UA: 6.5 (ref 5–8)
POTASSIUM SERPL-SCNC: 3.9 MMOL/L (ref 3.5–5)
PROSTATE SPECIFIC ANTIGEN: 7.84 NG/ML (ref 0–4)
PROTEIN UA: NEGATIVE MG/DL
RBC UA: 1 /HPF (ref 0–4)
SODIUM BLD-SCNC: 138 MMOL/L (ref 136–145)
SPECIFIC GRAVITY UA: 1 (ref 1–1.03)
TOTAL PROTEIN: 7.8 G/DL (ref 6.6–8.7)
TRIGL SERPL-MCNC: 147 MG/DL (ref 0–149)
TSH SERPL DL<=0.05 MIU/L-ACNC: 1.57 UIU/ML (ref 0.27–4.2)
UROBILINOGEN, URINE: 0.2 E.U./DL
VITAMIN B-12: 980 PG/ML (ref 211–946)
VITAMIN D 25-HYDROXY: 29.4 NG/ML
WBC UA: 40 /HPF (ref 0–5)

## 2021-08-02 PROCEDURE — 99213 OFFICE O/P EST LOW 20 MIN: CPT | Performed by: NURSE PRACTITIONER

## 2021-08-02 RX ORDER — CIPROFLOXACIN 500 MG/1
500 TABLET, FILM COATED ORAL 2 TIMES DAILY
Qty: 28 TABLET | Refills: 0 | Status: SHIPPED | OUTPATIENT
Start: 2021-08-02 | End: 2021-08-16

## 2021-08-02 NOTE — ASSESSMENT & PLAN NOTE
With elevated PSA today and bacteria in the urine I think he probably has a prostatitis we will treat with Cipro 500 twice daily for 14 days and repeat his urine in about 4 weeks

## 2021-08-02 NOTE — PROGRESS NOTES
daily) 30 tablet 1    alfuzosin (UROXATRAL) 10 MG extended release tablet Take 1 tablet by mouth daily 90 tablet 3    Multiple Vitamins-Minerals (CENTRUM SILVER PO) Take by mouth      Omega-3 Fatty Acids (FISH OIL) 1000 MG CAPS Take 1,000 mg by mouth      CPAP Machine MISC by Does not apply route CPAP supplies 1 each 0     No current facility-administered medications for this visit.      Allergies   Allergen Reactions    Aspirin Swelling    Nsaids Hives and Itching       Health Maintenance   Topic Date Due    COVID-19 Vaccine (1) Never done    DTaP/Tdap/Td vaccine (1 - Tdap) Never done    Diabetes screen  Never done    Shingles Vaccine (1 of 2) Never done    Flu vaccine (1) 09/01/2021    Colon cancer screen colonoscopy  03/08/2022    Potassium monitoring  08/02/2022    Creatinine monitoring  08/02/2022    PSA counseling  08/02/2022    Lipid screen  08/02/2026    Hepatitis C screen  Completed    HIV screen  Completed    Hepatitis A vaccine  Aged Out    Hepatitis B vaccine  Aged Out    Hib vaccine  Aged Out    Meningococcal (ACWY) vaccine  Aged Out    Pneumococcal 0-64 years Vaccine  Aged Out       No results found for: LABA1C  Lab Results   Component Value Date    PSA 7.84 (H) 08/02/2021    PSA 1.62 08/28/2020    PSA 1.05 06/12/2019     TSH   Date Value Ref Range Status   08/02/2021 1.570 0.270 - 4.200 uIU/mL Final   ]  Lab Results   Component Value Date     08/02/2021    K 3.9 08/02/2021    CL 94 (L) 08/02/2021    CO2 34 (H) 08/02/2021    BUN 16 08/02/2021    CREATININE 1.0 08/02/2021    GLUCOSE 105 08/02/2021    CALCIUM 9.6 08/02/2021    PROT 7.8 08/02/2021    LABALBU 4.1 08/02/2021    BILITOT 0.5 08/02/2021    ALKPHOS 73 08/02/2021    AST 33 08/02/2021    ALT 42 (H) 08/02/2021    LABGLOM >60 08/02/2021    GFRAA >59 08/02/2021     Lab Results   Component Value Date    CHOL 186 08/02/2021    CHOL 181 08/28/2020     Lab Results   Component Value Date    TRIG 147 08/02/2021    TRIG 89 08/28/2020     Lab Results   Component Value Date    HDL 31 (L) 08/02/2021    HDL 55 08/28/2020    HDL 28 (L) 06/17/2019     Lab Results   Component Value Date    LDLCALC 126 08/02/2021    LDLCALC 108 08/28/2020    LDLCALC 114 06/17/2019     Lab Results   Component Value Date     08/02/2021    K 3.9 08/02/2021    CL 94 08/02/2021    CO2 34 08/02/2021    BUN 16 08/02/2021    CREATININE 1.0 08/02/2021    GLUCOSE 105 08/02/2021    CALCIUM 9.6 08/02/2021      Lab Results   Component Value Date    WBC 8.2 08/28/2020    HGB 14.3 08/28/2020    HCT 45.8 08/28/2020    MCV 86.1 08/28/2020     08/28/2020    LYMPHOPCT 25.7 08/28/2020    RBC 5.32 08/28/2020    MCH 26.9 (L) 08/28/2020    MCHC 31.2 (L) 08/28/2020    RDW 13.7 08/28/2020     Lab Results   Component Value Date    VITD25 29.4 (L) 08/02/2021     Labs reviewed from today   Diagnostics reviewed from   Subjective:      Review of Systems   Genitourinary: Positive for dysuria. Objective:     Physical Exam   Constitutional  well developed, well nourished. No diaphoresis or acute distress. Neck- ROM appears normal  Extremities -No cyanosis, no edema  Pulmonary  effort appears normal.  No respiratory distress. No accessory muscle use  Neurologic  alert and oriented X 3. Cranial Nerves II-XII grossly intact  Skin color is good;  no sign of dehydration   Psychiatric  mood, affect, and behavior appear normal.  Judgment and thought processes appear normal.       There were no vitals taken for this visit.   Vital signs were not taken at this visit as no equipment was available;              Assessment:      Problem List     Acute prostatitis - Primary     With elevated PSA today and bacteria in the urine I think he probably has a prostatitis we will treat with Cipro 500 twice daily for 14 days and repeat his urine in about 4 weeks          Relevant Orders    Urinalysis Reflex to Culture          Plan:        Patient given educational materials - see patient instructions. Discussed use, benefit, and side effects of prescribed medications. Allpatient questions answered. Pt voiced understanding. Reviewed health maintenance. Instructed to continue current medications, diet and exercise. Patient agreed with treatment plan. Follow up as directed. MEDICATIONS:  Orders Placed This Encounter   Medications    ciprofloxacin (CIPRO) 500 MG tablet     Sig: Take 1 tablet by mouth 2 times daily for 14 days     Dispense:  28 tablet     Refill:  0         ORDERS:  Orders Placed This Encounter   Procedures    Urinalysis Reflex to Culture       Follow-up:  No follow-ups on file. PATIENT INSTRUCTIONS:  Patient Instructions   1. Prostatitis Cipro 500 twice a day for 14 days    Electronically signed by SHARON Vernon on 8/2/2021 at 1:18 PM    @    EMRDragon/transcription disclaimer:  Much of this encounter note is electronic transcription/translation of spoken language to printed texts. The electronic translation of spoken language may be erroneous, or at times,nonsensical words or phrases may be inadvertently transcribed.   Although I have reviewed the note for such errors, some may still exist.

## 2021-08-04 LAB
ORGANISM: ABNORMAL
URINE CULTURE, ROUTINE: ABNORMAL
URINE CULTURE, ROUTINE: ABNORMAL

## 2021-12-22 RX ORDER — CEFDINIR 300 MG/1
300 CAPSULE ORAL 2 TIMES DAILY
Qty: 14 CAPSULE | Refills: 0 | Status: SHIPPED | OUTPATIENT
Start: 2021-12-22 | End: 2021-12-29

## 2021-12-26 ENCOUNTER — PATIENT MESSAGE (OUTPATIENT)
Dept: INTERNAL MEDICINE | Age: 61
End: 2021-12-26

## 2021-12-26 DIAGNOSIS — I10 ESSENTIAL HYPERTENSION: Primary | ICD-10-CM

## 2021-12-27 RX ORDER — LISINOPRIL 40 MG/1
40 TABLET ORAL DAILY
Qty: 30 TABLET | Refills: 5 | Status: SHIPPED | OUTPATIENT
Start: 2021-12-27 | End: 2022-05-23

## 2021-12-27 NOTE — TELEPHONE ENCOUNTER
Ria Martinez called to request a refill on his medication.       Last office visit : 8/2/2021   Next office visit : Sent patient back a [employer] message to schedule appointment     Requested Prescriptions     Pending Prescriptions Disp Refills    lisinopril (PRINIVIL;ZESTRIL) 40 MG tablet 30 tablet 5     Sig: Take 1 tablet by mouth daily            Kostas Ornelas MA

## 2021-12-27 NOTE — TELEPHONE ENCOUNTER
From: Juliana Giles  To: Josh Brooks  Sent: 12/26/2021 2:09 PM CST  Subject: Lisinopril    I hope you had a wonderful Emmanuel! When you have time could I please get a refill on Lisinopril sent to [facility]? Thank you for taking care of us and call me if needed.  Negra Cardozo

## 2022-02-24 ENCOUNTER — TELEPHONE (OUTPATIENT)
Dept: UROLOGY | Age: 62
End: 2022-02-24

## 2022-02-24 DIAGNOSIS — N40.1 BPH WITH OBSTRUCTION/LOWER URINARY TRACT SYMPTOMS: ICD-10-CM

## 2022-02-24 DIAGNOSIS — N13.8 BPH WITH OBSTRUCTION/LOWER URINARY TRACT SYMPTOMS: ICD-10-CM

## 2022-02-24 RX ORDER — ALFUZOSIN HYDROCHLORIDE 10 MG/1
10 TABLET, EXTENDED RELEASE ORAL DAILY
Qty: 90 TABLET | Refills: 0 | Status: SHIPPED | OUTPATIENT
Start: 2022-02-24 | End: 2023-02-24

## 2022-02-24 NOTE — TELEPHONE ENCOUNTER
Please get him scheduled for his yearly with PSA prior and let him know I will send in a 90 day supply of medication to get him through until his appointment.

## 2022-03-14 ENCOUNTER — OFFICE VISIT (OUTPATIENT)
Dept: INTERNAL MEDICINE | Age: 62
End: 2022-03-14

## 2022-03-14 VITALS
HEIGHT: 66 IN | TEMPERATURE: 97.9 F | DIASTOLIC BLOOD PRESSURE: 78 MMHG | HEART RATE: 87 BPM | BODY MASS INDEX: 50.62 KG/M2 | WEIGHT: 315 LBS | SYSTOLIC BLOOD PRESSURE: 136 MMHG | RESPIRATION RATE: 18 BRPM | OXYGEN SATURATION: 97 %

## 2022-03-14 DIAGNOSIS — N41.0 ACUTE PROSTATITIS: ICD-10-CM

## 2022-03-14 DIAGNOSIS — R05.3 COVID-19 LONG HAULER MANIFESTING CHRONIC COUGH: ICD-10-CM

## 2022-03-14 DIAGNOSIS — U09.9 COVID-19 LONG HAULER MANIFESTING CHRONIC COUGH: ICD-10-CM

## 2022-03-14 LAB
BACTERIA: ABNORMAL /HPF
BILIRUBIN URINE: NEGATIVE
BLOOD, URINE: ABNORMAL
CLARITY: ABNORMAL
COLOR: YELLOW
CRYSTALS, UA: ABNORMAL /HPF
EPITHELIAL CELLS, UA: 0 /HPF (ref 0–5)
GLUCOSE URINE: NEGATIVE MG/DL
HYALINE CASTS: 10 /HPF (ref 0–8)
KETONES, URINE: ABNORMAL MG/DL
LEUKOCYTE ESTERASE, URINE: ABNORMAL
NITRITE, URINE: POSITIVE
PH UA: 6 (ref 5–8)
PROTEIN UA: 30 MG/DL
RBC UA: 18 /HPF (ref 0–4)
SPECIFIC GRAVITY UA: 1.02 (ref 1–1.03)
UROBILINOGEN, URINE: 1 E.U./DL
WBC UA: 64 /HPF (ref 0–5)

## 2022-03-14 PROCEDURE — 99213 OFFICE O/P EST LOW 20 MIN: CPT | Performed by: NURSE PRACTITIONER

## 2022-03-14 RX ORDER — CIPROFLOXACIN 500 MG/1
500 TABLET, FILM COATED ORAL 2 TIMES DAILY
Qty: 20 TABLET | Refills: 0 | Status: SHIPPED | OUTPATIENT
Start: 2022-03-14 | End: 2022-03-24

## 2022-03-14 ASSESSMENT — ENCOUNTER SYMPTOMS
BLOOD IN STOOL: 0
EYE DISCHARGE: 0
VOMITING: 0
EYE ITCHING: 0
CONSTIPATION: 0
TROUBLE SWALLOWING: 0
BACK PAIN: 1
COLOR CHANGE: 0
STRIDOR: 0
CHOKING: 0
NAUSEA: 0
COUGH: 0
ABDOMINAL PAIN: 0
SHORTNESS OF BREATH: 0
ABDOMINAL DISTENTION: 0
SORE THROAT: 0
WHEEZING: 0
DIARRHEA: 0

## 2022-03-14 ASSESSMENT — PATIENT HEALTH QUESTIONNAIRE - PHQ9
2. FEELING DOWN, DEPRESSED OR HOPELESS: 0
SUM OF ALL RESPONSES TO PHQ9 QUESTIONS 1 & 2: 0
SUM OF ALL RESPONSES TO PHQ QUESTIONS 1-9: 0
1. LITTLE INTEREST OR PLEASURE IN DOING THINGS: 0
SUM OF ALL RESPONSES TO PHQ QUESTIONS 1-9: 0

## 2022-03-14 NOTE — PATIENT INSTRUCTIONS
1.  Prostatitis cipro 500 twice  Daily    2.    Acute sinusitis    Saline nasal spray 4 times a day both nares for 7 days  Steroid spray, rhinocort, nasocort, nasonex, flonase twice daily about 5 minutes after the saline   mucinex 1200 mg twice daily for 7 days with plenty of water

## 2022-03-14 NOTE — PROGRESS NOTES
[address] [facility]  [address] [address] [address] [place] [address]  Dept: [phone]  Dept Fax: [phone]: [phone]    Aki Batres (:  1960) is a 64 y.o. male,Established patient  with green, here for evaluation of the following chief complaint(s): Other ( Prostatitis)      Aki Batres is a 64 y.o. male who presents today for his medical conditions/complaints as noted below. Aki Batres is c/opal Other ( Prostatitis)        HPI:     Chief Complaint   Patient presents with    Other      Prostatitis     HPI   1. Fever with history of prostatitis;   2. Feels he had COVID middle of Dec;  He is still coughing up sputum at least once a day       Past Medical History:   Diagnosis Date    Hypertension       History reviewed. No pertinent surgical history.     Vitals 3/14/2022 2020 2020 2020 11/10/2020    SYSTOLIC 083 - 724 326 - -   DIASTOLIC 78 - 98 363 - -   Pulse 87 - 98 113 - -   Temp 97.9 98.4 - 98.9 97.7 97.6   Resp 18 - - - - -   SpO2 97 - - - - -   Weight 320 lb 303 lb 303 lb 300 lb 9.6 oz 303 lb 311 lb   Height 5' 6\" 5' 6\" 5' 6\" 5' 6\" 5' 7\" 5' 6\"   Body mass index 51.64 kg/m2 48.9 kg/m2 48.9 kg/m2 48.51 kg/m2 47.45 kg/m2 50.19 kg/m2   Some recent data might be hidden       Family History   Adopted: Yes       Social History     Tobacco Use    Smoking status: Never Smoker    Smokeless tobacco: Never Used   Substance Use Topics    Alcohol use: No      Current Outpatient Medications   Medication Sig Dispense Refill    ciprofloxacin (CIPRO) 500 MG tablet Take 1 tablet by mouth 2 times daily for 10 days 20 tablet 0    alfuzosin (UROXATRAL) 10 MG extended release tablet Take 1 tablet by mouth daily 90 tablet 0    lisinopril (PRINIVIL;ZESTRIL) 40 MG tablet Take 1 tablet by mouth daily 30 tablet 5    hydroCHLOROthiazide (HYDRODIURIL) 25 MG tablet Take 1 tablet by mouth daily 30 tablet 1    CPAP Machine MISC by Does not apply route CPAP supplies 1 each 0    Multiple Vitamins-Minerals (CENTRUM SILVER PO) Take by mouth (Patient not taking: Reported on 3/14/2022)      Omega-3 Fatty Acids (FISH OIL) 1000 MG CAPS Take 1,000 mg by mouth (Patient not taking: Reported on 3/14/2022)       No current facility-administered medications for this visit.      Allergies   Allergen Reactions    Aspirin Swelling    Nsaids Hives and Itching       Health Maintenance   Topic Date Due    COVID-19 Vaccine (1) Never done    DTaP/Tdap/Td vaccine (1 - Tdap) Never done    Diabetes screen  Never done    Shingles Vaccine (1 of 2) Never done    Colorectal Cancer Screen  03/08/2022    Potassium monitoring  08/02/2022    Creatinine monitoring  08/02/2022    PSA counseling  08/02/2022    Depression Screen  03/14/2023    Lipid screen  08/02/2026    Flu vaccine  Completed    Hepatitis C screen  Completed    HIV screen  Completed    Hepatitis A vaccine  Aged Out    Hepatitis B vaccine  Aged Out    Hib vaccine  Aged Out    Meningococcal (ACWY) vaccine  Aged Out    Pneumococcal 0-64 years Vaccine  Aged Out       No results found for: LABA1C  Lab Results   Component Value Date    PSA 7.84 (H) 08/02/2021    PSA 1.62 08/28/2020    PSA 1.05 06/12/2019     TSH   Date Value Ref Range Status   08/02/2021 1.570 0.270 - 4.200 uIU/mL Final   ]  Lab Results   Component Value Date     08/02/2021    K 3.9 08/02/2021    CL 94 (L) 08/02/2021    CO2 34 (H) 08/02/2021    BUN 16 08/02/2021    CREATININE 1.0 08/02/2021    GLUCOSE 105 08/02/2021    CALCIUM 9.6 08/02/2021    PROT 7.8 08/02/2021    LABALBU 4.1 08/02/2021    BILITOT 0.5 08/02/2021    ALKPHOS 73 08/02/2021    AST 33 08/02/2021    ALT 42 (H) 08/02/2021    LABGLOM >60 08/02/2021    GFRAA >59 08/02/2021     Lab Results   Component Value Date    CHOL 186 08/02/2021    CHOL 181 08/28/2020     Lab Results   Component Value Date    TRIG 147 08/02/2021    TRIG 89 08/28/2020     Lab Results Component Value Date    HDL 31 (L) 08/02/2021    HDL 55 08/28/2020    HDL 28 (L) 06/17/2019     Lab Results   Component Value Date    LDLCALC 126 08/02/2021    LDLCALC 108 08/28/2020    LDLCALC 114 06/17/2019     Lab Results   Component Value Date     08/02/2021    K 3.9 08/02/2021    CL 94 08/02/2021    CO2 34 08/02/2021    BUN 16 08/02/2021    CREATININE 1.0 08/02/2021    GLUCOSE 105 08/02/2021    CALCIUM 9.6 08/02/2021      Lab Results   Component Value Date    WBC 8.2 08/28/2020    HGB 14.3 08/28/2020    HCT 45.8 08/28/2020    MCV 86.1 08/28/2020     08/28/2020    LYMPHOPCT 25.7 08/28/2020    RBC 5.32 08/28/2020    MCH 26.9 (L) 08/28/2020    MCHC 31.2 (L) 08/28/2020    RDW 13.7 08/28/2020     Lab Results   Component Value Date    VITD25 29.4 (L) 08/02/2021     Labs reviewed from today     Subjective:      Review of Systems   Constitutional: Positive for fever. Negative for fatigue and unexpected weight change. HENT: Negative for ear discharge, ear pain, mouth sores, sore throat and trouble swallowing. Eyes: Negative for discharge, itching and visual disturbance. Respiratory: Negative for cough, choking, shortness of breath, wheezing and stridor. Cardiovascular: Negative for chest pain, palpitations and leg swelling. Gastrointestinal: Negative for abdominal distention, abdominal pain, blood in stool, constipation, diarrhea, nausea and vomiting. Endocrine: Negative for cold intolerance, polydipsia and polyuria. Genitourinary: Negative for difficulty urinating, dysuria, frequency and urgency. Musculoskeletal: Positive for back pain. Negative for arthralgias and gait problem. Skin: Negative for color change and rash. Allergic/Immunologic: Negative for food allergies and immunocompromised state. Neurological: Negative for dizziness, tremors, syncope, speech difficulty, weakness and headaches. Hematological: Negative for adenopathy. Does not bruise/bleed easily. Psychiatric/Behavioral: Negative for confusion and hallucinations. Objective:     Physical Exam  Constitutional:       General: He is not in acute distress. Appearance: He is well-developed. HENT:      Head: Normocephalic and atraumatic. Eyes:      General: No scleral icterus. Right eye: No discharge. Left eye: No discharge. Pupils: Pupils are equal, round, and reactive to light. Neck:      Thyroid: No thyromegaly. Vascular: No JVD. Cardiovascular:      Rate and Rhythm: Normal rate and regular rhythm. Heart sounds: Normal heart sounds. No murmur heard. Pulmonary:      Effort: Pulmonary effort is normal. No respiratory distress. Breath sounds: Normal breath sounds. No wheezing or rales. Abdominal:      General: Bowel sounds are normal. There is no distension. Palpations: Abdomen is soft. There is no mass. Tenderness: There is no abdominal tenderness. There is no guarding or rebound. Musculoskeletal:         General: No tenderness. Normal range of motion. Cervical back: Normal range of motion and neck supple. Skin:     General: Skin is warm and dry. Findings: No erythema or rash. Neurological:      Mental Status: He is alert and oriented to person, place, and time. Cranial Nerves: No cranial nerve deficit. Coordination: Coordination normal.      Deep Tendon Reflexes: Reflexes are normal and symmetric. Reflexes normal.   Psychiatric:         Mood and Affect: Mood is not depressed. Behavior: Behavior normal.         Thought Content:  Thought content normal.         Judgment: Judgment normal.       /78   Pulse 87   Temp 97.9 °F (36.6 °C) (Temporal)   Resp 18   Ht 5' 6\" (1.676 m)   Wt (!) 320 lb (145.2 kg)   SpO2 97%   BMI 51.65 kg/m²           Assessment:      Problem List     Acute prostatitis     Cipro 500 twice daily for 10 days          COVID-19 long hauler manifesting chronic cough     Saline, steroid spray, Mucinex                Plan:        Patient given educational materials - see patient instructions. Discussed use, benefit, and side effects of prescribed medications. Allpatient questions answered. Pt voiced understanding. Reviewed health maintenance. Instructed to continue current medications, diet and exercise. Patient agreed with treatment plan. Follow up as directed. MEDICATIONS:  Orders Placed This Encounter   Medications    ciprofloxacin (CIPRO) 500 MG tablet     Sig: Take 1 tablet by mouth 2 times daily for 10 days     Dispense:  20 tablet     Refill:  0         ORDERS:  No orders of the defined types were placed in this encounter. Follow-up:  Return for keep fu appt, have labs done prior to appt. PATIENT INSTRUCTIONS:  Patient Instructions   1. Prostatitis cipro 500 twice  Daily    2. Acute sinusitis    Saline nasal spray 4 times a day both nares for 7 days  Steroid spray, rhinocort, nasocort, nasonex, flonase twice daily about 5 minutes after the saline   mucinex 1200 mg twice daily for 7 days with plenty of water          Electronically signed by SHARON Harden on 3/14/2022 at 9:41 AM    @On this date 3/14/2022 I have spent 15minutes reviewing previous notes, test results and face to face with the patient discussing the diagnosis and importance of compliance with the treatment plan as well as documenting on the day of the visit. EMRDragon/transcription disclaimer:  Much of this encounter note is electronic transcription/translation of spoken language to printed texts. The electronic translation of spoken language may be erroneous, or at times,nonsensical words or phrases may be inadvertently transcribed.   Although I have reviewed the note for such errors, some may still exist.

## 2022-03-16 LAB
ORGANISM: ABNORMAL
URINE CULTURE, ROUTINE: ABNORMAL
URINE CULTURE, ROUTINE: ABNORMAL

## 2022-04-02 ENCOUNTER — PATIENT MESSAGE (OUTPATIENT)
Dept: INTERNAL MEDICINE | Age: 62
End: 2022-04-02

## 2022-04-04 RX ORDER — HYDROCHLOROTHIAZIDE 25 MG/1
25 TABLET ORAL DAILY
Qty: 30 TABLET | Refills: 5 | Status: SHIPPED | OUTPATIENT
Start: 2022-04-04 | End: 2022-06-03

## 2022-04-04 NOTE — TELEPHONE ENCOUNTER
From: Gagan Alvarado  To: Daphne Anne  Sent: 4/2/2022 10:02 AM CDT  Subject: Hydrochlorothiazide    Could I please get some refills on the subject medicine sent to [facility] in [place]. If you need to call me my phone number is [phone].  Thanks, Charan Ascencio

## 2022-04-04 NOTE — TELEPHONE ENCOUNTER
Susanne Villarreal called requesting a refill of the below medication which has been pended for you:     Requested Prescriptions     Pending Prescriptions Disp Refills    hydroCHLOROthiazide (HYDRODIURIL) 25 MG tablet 30 tablet 1     Sig: Take 1 tablet by mouth daily       Last Appointment Date: 3/14/2022  Next Appointment Date: Visit date not found    Allergies   Allergen Reactions    Aspirin Swelling    Nsaids Hives and Itching

## 2022-05-23 ENCOUNTER — OFFICE VISIT (OUTPATIENT)
Dept: INTERNAL MEDICINE | Age: 62
End: 2022-05-23

## 2022-05-23 VITALS
WEIGHT: 315 LBS | HEIGHT: 68 IN | OXYGEN SATURATION: 95 % | HEART RATE: 90 BPM | TEMPERATURE: 97.5 F | BODY MASS INDEX: 47.74 KG/M2 | SYSTOLIC BLOOD PRESSURE: 138 MMHG | DIASTOLIC BLOOD PRESSURE: 88 MMHG

## 2022-05-23 DIAGNOSIS — J01.90 ACUTE NON-RECURRENT SINUSITIS, UNSPECIFIED LOCATION: ICD-10-CM

## 2022-05-23 PROCEDURE — 96372 THER/PROPH/DIAG INJ SC/IM: CPT | Performed by: NURSE PRACTITIONER

## 2022-05-23 PROCEDURE — 99214 OFFICE O/P EST MOD 30 MIN: CPT | Performed by: NURSE PRACTITIONER

## 2022-05-23 RX ORDER — ALBUTEROL SULFATE 90 UG/1
2 AEROSOL, METERED RESPIRATORY (INHALATION) EVERY 6 HOURS PRN
Qty: 18 G | Refills: 3 | Status: SHIPPED | OUTPATIENT
Start: 2022-05-23

## 2022-05-23 RX ORDER — DEXAMETHASONE SODIUM PHOSPHATE 10 MG/ML
10 INJECTION INTRAMUSCULAR; INTRAVENOUS ONCE
Status: COMPLETED | OUTPATIENT
Start: 2022-05-23 | End: 2022-05-23

## 2022-05-23 RX ORDER — CEFDINIR 300 MG/1
300 CAPSULE ORAL 2 TIMES DAILY
Qty: 14 CAPSULE | Refills: 0 | Status: SHIPPED | OUTPATIENT
Start: 2022-05-23 | End: 2022-05-30

## 2022-05-23 RX ADMIN — DEXAMETHASONE SODIUM PHOSPHATE 10 MG: 10 INJECTION INTRAMUSCULAR; INTRAVENOUS at 16:29

## 2022-05-23 SDOH — ECONOMIC STABILITY: FOOD INSECURITY: WITHIN THE PAST 12 MONTHS, THE FOOD YOU BOUGHT JUST DIDN'T LAST AND YOU DIDN'T HAVE MONEY TO GET MORE.: NEVER TRUE

## 2022-05-23 SDOH — ECONOMIC STABILITY: FOOD INSECURITY: WITHIN THE PAST 12 MONTHS, YOU WORRIED THAT YOUR FOOD WOULD RUN OUT BEFORE YOU GOT MONEY TO BUY MORE.: NEVER TRUE

## 2022-05-23 ASSESSMENT — ENCOUNTER SYMPTOMS
TROUBLE SWALLOWING: 0
ABDOMINAL PAIN: 0
NAUSEA: 0
STRIDOR: 0
EYE DISCHARGE: 0
DIARRHEA: 0
CHOKING: 0
EYE ITCHING: 0
SINUS PAIN: 1
WHEEZING: 0
COUGH: 1
SHORTNESS OF BREATH: 0
SORE THROAT: 0
CONSTIPATION: 0
ABDOMINAL DISTENTION: 0
BLOOD IN STOOL: 0
COLOR CHANGE: 0
VOMITING: 0

## 2022-05-23 ASSESSMENT — SOCIAL DETERMINANTS OF HEALTH (SDOH): HOW HARD IS IT FOR YOU TO PAY FOR THE VERY BASICS LIKE FOOD, HOUSING, MEDICAL CARE, AND HEATING?: NOT HARD AT ALL

## 2022-05-23 NOTE — PATIENT INSTRUCTIONS
1.  Acute sinusitis    Acute sinusitis  Decadron 10 mg daily    Cefdinir 300 twice  a day for 7 days  get 2 doses in today    Saline nasal spray 4 times a day both nares for 7 days  Steroid spray, rhinocort, nasocort, nasonex, flonase twice daily about 5 minutes after the saline   mucinex 1200 mg twice daily for 7 days with plenty of water  Delsym cough syrup up to 3 times daily as needed for cough   Ricola cough drops, honey lemon in pink bag;  has echanesia to help build your immunity   proair inhaler  4 times a day util you are better then another 24 days;   Then start weaning off every 2 days decrease by 1 treatment

## 2022-05-23 NOTE — PROGRESS NOTES
[facility] PHYSICIAN SERVICES  [facility] INTERNAL MEDICINE  [address] [facility] [address]  [address] [address]  Dept: [phone]  Dept Fax: [phone]: [phone]    Osito Landon (:  1960) is a 64 y.o. male,Established patient  with green , here for evaluation of the following chief complaint(s): Congestion (Symptoms started last Wednesday )      Osito Landon is a 64 y.o. male who presents today for his medical conditions/complaints as noted below. Osito Landon is c/opal Congestion (Symptoms started last Wednesday )        HPI:     Chief Complaint   Patient presents with    Congestion     Symptoms started last Wednesday      HPI   1. Acute bronchitis   Cough and congestion  That has not cleared with otc meds; He hash ad no fever but has yellow to green sputum        Past Medical History:   Diagnosis Date    Hypertension       No past surgical history on file.     Vitals 2022 3/14/2022 2020 2020 2020    SYSTOLIC 681 193 - 970 118 -   DIASTOLIC 88 78 - 98 018 -   Pulse 90 87 - 98 113 -   Temp 97.5 97.9 98.4 - 98.9 97.7   Resp - 18 - - - -   SpO2 95 97 - - - -   Weight 318 lb 320 lb 303 lb 303 lb 300 lb 9.6 oz 303 lb   Height 5' 8\" 5' 6\" 5' 6\" 5' 6\" 5' 6\" 5' 7\"   Body mass index 48.35 kg/m2 51.64 kg/m2 48.9 kg/m2 48.9 kg/m2 48.51 kg/m2 47.45 kg/m2   Some recent data might be hidden       Family History   Adopted: Yes       Social History     Tobacco Use    Smoking status: Never Smoker    Smokeless tobacco: Never Used   Substance Use Topics    Alcohol use: No      Current Outpatient Medications   Medication Sig Dispense Refill    cefdinir (OMNICEF) 300 MG capsule Take 1 capsule by mouth 2 times daily for 7 days 14 capsule 0    albuterol sulfate  (90 Base) MCG/ACT inhaler Inhale 2 puffs into the lungs every 6 hours as needed for Wheezing 18 g 3    hydroCHLOROthiazide (HYDRODIURIL) 25 MG tablet Take 1 tablet by mouth daily 30 tablet 5    alfuzosin (UROXATRAL) 10 MG extended release tablet Take 1 tablet by mouth daily 90 tablet 0    lisinopril (PRINIVIL;ZESTRIL) 40 MG tablet Take 1 tablet by mouth daily 30 tablet 5    CPAP Machine MISC by Does not apply route CPAP supplies 1 each 0    Multiple Vitamins-Minerals (CENTRUM SILVER PO) Take by mouth (Patient not taking: Reported on 3/14/2022)      Omega-3 Fatty Acids (FISH OIL) 1000 MG CAPS Take 1,000 mg by mouth (Patient not taking: Reported on 3/14/2022)       No current facility-administered medications for this visit.      Allergies   Allergen Reactions    Aspirin Swelling    Nsaids Hives and Itching       Health Maintenance   Topic Date Due    COVID-19 Vaccine (1) Never done    DTaP/Tdap/Td vaccine (1 - Tdap) Never done    Diabetes screen  Never done    Shingles vaccine (1 of 2) Never done    Colorectal Cancer Screen  03/08/2022    Prostate Specific Antigen (PSA) Screening or Monitoring  08/02/2022    Depression Screen  03/14/2023    Lipids  08/02/2026    Flu vaccine  Completed    Hepatitis C screen  Completed    HIV screen  Completed    Hepatitis A vaccine  Aged Out    Hepatitis B vaccine  Aged Out    Hib vaccine  Aged Out    Meningococcal (ACWY) vaccine  Aged Out    Pneumococcal 0-64 years Vaccine  Aged Out       No results found for: LABA1C  Lab Results   Component Value Date    PSA 7.84 (H) 08/02/2021    PSA 1.62 08/28/2020    PSA 1.05 06/12/2019     TSH   Date Value Ref Range Status   08/02/2021 1.570 0.270 - 4.200 uIU/mL Final   ]  Lab Results   Component Value Date     08/02/2021    K 3.9 08/02/2021    CL 94 (L) 08/02/2021    CO2 34 (H) 08/02/2021    BUN 16 08/02/2021    CREATININE 1.0 08/02/2021    GLUCOSE 105 08/02/2021    CALCIUM 9.6 08/02/2021    PROT 7.8 08/02/2021    LABALBU 4.1 08/02/2021    BILITOT 0.5 08/02/2021    ALKPHOS 73 08/02/2021    AST 33 08/02/2021    ALT 42 (H) 08/02/2021    LABGLOM >60 08/02/2021    GFRAA >59 08/02/2021     Lab Results   Component Value Date    CHOL 186 08/02/2021    CHOL 181 08/28/2020     Lab Results   Component Value Date    TRIG 147 08/02/2021    TRIG 89 08/28/2020     Lab Results   Component Value Date    HDL 31 (L) 08/02/2021    HDL 55 08/28/2020    HDL 28 (L) 06/17/2019     Lab Results   Component Value Date    LDLCALC 126 08/02/2021    LDLCALC 108 08/28/2020    LDLCALC 114 06/17/2019     Lab Results   Component Value Date     08/02/2021    K 3.9 08/02/2021    CL 94 08/02/2021    CO2 34 08/02/2021    BUN 16 08/02/2021    CREATININE 1.0 08/02/2021    GLUCOSE 105 08/02/2021    CALCIUM 9.6 08/02/2021      Lab Results   Component Value Date    WBC 8.2 08/28/2020    HGB 14.3 08/28/2020    HCT 45.8 08/28/2020    MCV 86.1 08/28/2020     08/28/2020    LYMPHOPCT 25.7 08/28/2020    RBC 5.32 08/28/2020    MCH 26.9 (L) 08/28/2020    MCHC 31.2 (L) 08/28/2020    RDW 13.7 08/28/2020     Lab Results   Component Value Date    VITD25 29.4 (L) 08/02/2021       Subjective:      Review of Systems   Constitutional: Negative for fatigue, fever and unexpected weight change. HENT: Positive for congestion and sinus pain. Negative for ear discharge, ear pain, mouth sores, sore throat and trouble swallowing. Eyes: Negative for discharge, itching and visual disturbance. Respiratory: Positive for cough. Negative for choking, shortness of breath, wheezing and stridor. Cardiovascular: Negative for chest pain, palpitations and leg swelling. Gastrointestinal: Negative for abdominal distention, abdominal pain, blood in stool, constipation, diarrhea, nausea and vomiting. Endocrine: Negative for cold intolerance, polydipsia and polyuria. Genitourinary: Negative for difficulty urinating, dysuria, frequency and urgency. Musculoskeletal: Negative for arthralgias and gait problem. Skin: Negative for color change and rash. Allergic/Immunologic: Negative for food allergies and immunocompromised state.    Neurological: Negative for dizziness, tremors, syncope, speech difficulty, weakness and headaches. Hematological: Negative for adenopathy. Does not bruise/bleed easily. Psychiatric/Behavioral: Negative for confusion and hallucinations. Objective:     Physical Exam  Constitutional:       General: He is not in acute distress. Appearance: He is well-developed. HENT:      Head: Normocephalic and atraumatic. Eyes:      General: No scleral icterus. Right eye: No discharge. Left eye: No discharge. Pupils: Pupils are equal, round, and reactive to light. Neck:      Thyroid: No thyromegaly. Vascular: No JVD. Cardiovascular:      Rate and Rhythm: Normal rate and regular rhythm. Heart sounds: Normal heart sounds. No murmur heard. Pulmonary:      Effort: Pulmonary effort is normal. No respiratory distress. Breath sounds: Normal breath sounds. No wheezing or rales. Comments: Coarse cough   Abdominal:      General: Bowel sounds are normal. There is no distension. Palpations: Abdomen is soft. There is no mass. Tenderness: There is no abdominal tenderness. There is no guarding or rebound. Musculoskeletal:         General: No tenderness. Normal range of motion. Cervical back: Normal range of motion and neck supple. Skin:     General: Skin is warm and dry. Findings: No erythema or rash. Neurological:      Mental Status: He is alert and oriented to person, place, and time. Cranial Nerves: No cranial nerve deficit. Coordination: Coordination normal.      Deep Tendon Reflexes: Reflexes are normal and symmetric. Reflexes normal.   Psychiatric:         Mood and Affect: Mood is not depressed. Behavior: Behavior normal.         Thought Content:  Thought content normal.         Judgment: Judgment normal.       /88   Pulse 90   Temp 97.5 °F (36.4 °C)   Ht 5' 8\" (1.727 m)   Wt (!) 318 lb (144.2 kg)   SpO2 95%   BMI 48.35 kg/m²           Assessment:      Problem List     Acute sinusitis               Relevant Medications    cefdinir (OMNICEF) 300 MG capsule          Plan:        Patient given educational materials - see patient instructions. Discussed use, benefit, and side effects of prescribed medications. Allpatient questions answered. Pt voiced understanding. Reviewed health maintenance. Instructed to continue current medications, diet and exercise. Patient agreed with treatment plan. Follow up as directed. MEDICATIONS:  Orders Placed This Encounter   Medications    dexamethasone (DECADRON) injection 10 mg    cefdinir (OMNICEF) 300 MG capsule     Sig: Take 1 capsule by mouth 2 times daily for 7 days     Dispense:  14 capsule     Refill:  0    albuterol sulfate  (90 Base) MCG/ACT inhaler     Sig: Inhale 2 puffs into the lungs every 6 hours as needed for Wheezing     Dispense:  18 g     Refill:  3         ORDERS:  No orders of the defined types were placed in this encounter. Follow-up:  Return for keep fu appt. PATIENT INSTRUCTIONS:  Patient Instructions   1. Acute sinusitis    Acute sinusitis  Decadron 10 mg daily    Cefdinir 300 twice  a day for 7 days  get 2 doses in today    Saline nasal spray 4 times a day both nares for 7 days  Steroid spray, rhinocort, nasocort, nasonex, flonase twice daily about 5 minutes after the saline   mucinex 1200 mg twice daily for 7 days with plenty of water  Delsym cough syrup up to 3 times daily as needed for cough   Ricola cough drops, honey lemon in pink bag;  has echanesia to help build your immunity   proair inhaler  4 times a day util you are better then another 24 days; Then start weaning off every 2 days decrease by 1 treatment          Electronically signed by SHARON Calderon on 5/23/2022 at 4:47 PM  EMRDragon/transcription disclaimer:  Much of this encounter note is electronic transcription/translation of spoken language to printed texts.   The electronic translation of spoken language may be erroneous, or at times,nonsensical words or phrases may be inadvertently transcribed.   Although I have reviewed the note for such errors, some may still exist.

## 2023-01-14 DIAGNOSIS — I10 ESSENTIAL HYPERTENSION: ICD-10-CM

## 2023-01-16 DIAGNOSIS — F41.9 ANXIETY: ICD-10-CM

## 2023-01-16 DIAGNOSIS — I10 ESSENTIAL HYPERTENSION: Primary | ICD-10-CM

## 2023-01-16 DIAGNOSIS — R30.0 DYSURIA: ICD-10-CM

## 2023-01-16 RX ORDER — LISINOPRIL 40 MG/1
40 TABLET ORAL DAILY
Qty: 30 TABLET | Refills: 5 | Status: SHIPPED | OUTPATIENT
Start: 2023-01-16 | End: 2023-03-17

## 2023-01-20 DIAGNOSIS — I10 ESSENTIAL HYPERTENSION: ICD-10-CM

## 2023-01-20 DIAGNOSIS — F41.9 ANXIETY: ICD-10-CM

## 2023-01-20 LAB
ALBUMIN SERPL-MCNC: 4.7 G/DL (ref 3.5–5.2)
ALP BLD-CCNC: 75 U/L (ref 40–130)
ALT SERPL-CCNC: 23 U/L (ref 5–41)
ANION GAP SERPL CALCULATED.3IONS-SCNC: 15 MMOL/L (ref 7–19)
AST SERPL-CCNC: 18 U/L (ref 5–40)
BASOPHILS ABSOLUTE: 0.1 K/UL (ref 0–0.2)
BASOPHILS RELATIVE PERCENT: 0.9 % (ref 0–1)
BILIRUB SERPL-MCNC: 0.7 MG/DL (ref 0.2–1.2)
BUN BLDV-MCNC: 17 MG/DL (ref 8–23)
CALCIUM SERPL-MCNC: 9.9 MG/DL (ref 8.8–10.2)
CHLORIDE BLD-SCNC: 96 MMOL/L (ref 98–111)
CHOLESTEROL, TOTAL: 192 MG/DL (ref 160–199)
CO2: 28 MMOL/L (ref 22–29)
CREAT SERPL-MCNC: 1 MG/DL (ref 0.5–1.2)
EOSINOPHILS ABSOLUTE: 0.1 K/UL (ref 0–0.6)
EOSINOPHILS RELATIVE PERCENT: 1.9 % (ref 0–5)
GFR SERPL CREATININE-BSD FRML MDRD: >60 ML/MIN/{1.73_M2}
GLUCOSE BLD-MCNC: 94 MG/DL (ref 74–109)
HCT VFR BLD CALC: 50.4 % (ref 42–52)
HDLC SERPL-MCNC: 55 MG/DL (ref 55–121)
HEMOGLOBIN: 16.1 G/DL (ref 14–18)
IMMATURE GRANULOCYTES #: 0 K/UL
LDL CHOLESTEROL CALCULATED: 116 MG/DL
LYMPHOCYTES ABSOLUTE: 1.5 K/UL (ref 1.1–4.5)
LYMPHOCYTES RELATIVE PERCENT: 23.6 % (ref 20–40)
MCH RBC QN AUTO: 26.6 PG (ref 27–31)
MCHC RBC AUTO-ENTMCNC: 31.9 G/DL (ref 33–37)
MCV RBC AUTO: 83.2 FL (ref 80–94)
MONOCYTES ABSOLUTE: 0.5 K/UL (ref 0–0.9)
MONOCYTES RELATIVE PERCENT: 7.9 % (ref 0–10)
NEUTROPHILS ABSOLUTE: 4.2 K/UL (ref 1.5–7.5)
NEUTROPHILS RELATIVE PERCENT: 65.4 % (ref 50–65)
PDW BLD-RTO: 14 % (ref 11.5–14.5)
PLATELET # BLD: 279 K/UL (ref 130–400)
PMV BLD AUTO: 9.7 FL (ref 9.4–12.4)
POTASSIUM SERPL-SCNC: 4.9 MMOL/L (ref 3.5–5)
PROSTATE SPECIFIC ANTIGEN: 1.37 NG/ML (ref 0–4)
RBC # BLD: 6.06 M/UL (ref 4.7–6.1)
SODIUM BLD-SCNC: 139 MMOL/L (ref 136–145)
TOTAL PROTEIN: 7.3 G/DL (ref 6.6–8.7)
TRIGL SERPL-MCNC: 107 MG/DL (ref 0–149)
TSH SERPL DL<=0.05 MIU/L-ACNC: 1.85 UIU/ML (ref 0.27–4.2)
WBC # BLD: 6.4 K/UL (ref 4.8–10.8)

## 2023-01-30 ENCOUNTER — OFFICE VISIT (OUTPATIENT)
Dept: INTERNAL MEDICINE | Age: 63
End: 2023-01-30

## 2023-01-30 VITALS
OXYGEN SATURATION: 98 % | SYSTOLIC BLOOD PRESSURE: 138 MMHG | BODY MASS INDEX: 44.73 KG/M2 | DIASTOLIC BLOOD PRESSURE: 88 MMHG | HEART RATE: 76 BPM | HEIGHT: 67 IN | WEIGHT: 285 LBS

## 2023-01-30 DIAGNOSIS — G47.09 OTHER INSOMNIA: ICD-10-CM

## 2023-01-30 DIAGNOSIS — G47.33 OSA (OBSTRUCTIVE SLEEP APNEA): ICD-10-CM

## 2023-01-30 DIAGNOSIS — E66.01 CLASS 3 SEVERE OBESITY DUE TO EXCESS CALORIES WITHOUT SERIOUS COMORBIDITY WITH BODY MASS INDEX (BMI) OF 40.0 TO 44.9 IN ADULT (HCC): ICD-10-CM

## 2023-01-30 PROBLEM — R31.29 MICROSCOPIC HEMATURIA: Status: RESOLVED | Noted: 2020-11-25 | Resolved: 2023-01-30

## 2023-01-30 PROBLEM — N41.0 ACUTE PROSTATITIS: Status: RESOLVED | Noted: 2021-08-02 | Resolved: 2023-01-30

## 2023-01-30 PROBLEM — N13.8 BPH WITH OBSTRUCTION/LOWER URINARY TRACT SYMPTOMS: Status: RESOLVED | Noted: 2020-11-24 | Resolved: 2023-01-30

## 2023-01-30 PROBLEM — U09.9 COVID-19 LONG HAULER MANIFESTING CHRONIC COUGH: Status: RESOLVED | Noted: 2022-03-14 | Resolved: 2023-01-30

## 2023-01-30 PROBLEM — K80.20 CALCULUS OF GALLBLADDER WITHOUT CHOLECYSTITIS: Status: RESOLVED | Noted: 2017-07-12 | Resolved: 2023-01-30

## 2023-01-30 PROBLEM — J01.90 ACUTE SINUSITIS: Status: RESOLVED | Noted: 2022-05-23 | Resolved: 2023-01-30

## 2023-01-30 PROBLEM — R05.3 COVID-19 LONG HAULER MANIFESTING CHRONIC COUGH: Status: RESOLVED | Noted: 2022-03-14 | Resolved: 2023-01-30

## 2023-01-30 PROBLEM — N40.1 BPH WITH OBSTRUCTION/LOWER URINARY TRACT SYMPTOMS: Status: RESOLVED | Noted: 2020-11-24 | Resolved: 2023-01-30

## 2023-01-30 PROCEDURE — 3079F DIAST BP 80-89 MM HG: CPT | Performed by: NURSE PRACTITIONER

## 2023-01-30 PROCEDURE — 99213 OFFICE O/P EST LOW 20 MIN: CPT | Performed by: NURSE PRACTITIONER

## 2023-01-30 PROCEDURE — 3075F SYST BP GE 130 - 139MM HG: CPT | Performed by: NURSE PRACTITIONER

## 2023-01-30 ASSESSMENT — ENCOUNTER SYMPTOMS
CONSTIPATION: 0
VOMITING: 0
CHOKING: 0
ABDOMINAL DISTENTION: 0
TROUBLE SWALLOWING: 0
COLOR CHANGE: 0
ABDOMINAL PAIN: 0
NAUSEA: 0
WHEEZING: 0
BLOOD IN STOOL: 0
STRIDOR: 0
COUGH: 0
SORE THROAT: 0
DIARRHEA: 0
SHORTNESS OF BREATH: 0
EYE ITCHING: 0
EYE DISCHARGE: 0

## 2023-01-30 ASSESSMENT — PATIENT HEALTH QUESTIONNAIRE - PHQ9
2. FEELING DOWN, DEPRESSED OR HOPELESS: 0
SUM OF ALL RESPONSES TO PHQ QUESTIONS 1-9: 0
1. LITTLE INTEREST OR PLEASURE IN DOING THINGS: 0
SUM OF ALL RESPONSES TO PHQ QUESTIONS 1-9: 0
SUM OF ALL RESPONSES TO PHQ9 QUESTIONS 1 & 2: 0
SUM OF ALL RESPONSES TO PHQ QUESTIONS 1-9: 0
SUM OF ALL RESPONSES TO PHQ QUESTIONS 1-9: 0

## 2023-01-30 NOTE — PATIENT INSTRUCTIONS
1.  #1 obesity continue with weight loss  2.   Insomnia he can take the Tylenol PM every night and up to 20 mg of melatonin Next #3 health maintenance PSA was normal

## 2023-01-30 NOTE — PROGRESS NOTES
[facility] PHYSICIAN SERVICES  [facility] INTERNAL MEDICINE  [address] [facility] [address] [address]  Dept: [phone]  Dept Fax: [phone]: [phone]    Jus Tomlinson (:  1960) is a 58 y.o. male,Established patient  with green, here for evaluation of the following chief complaint(s): Georgie Kaye is a 58 y.o. male who presents today for his medical conditions/complaints as noted below. Jus Tomlinson is c/opal 6 Month Follow-Up        HPI:     Chief Complaint   Patient presents with    6 Month Follow-Up     HPI    Obesity; he has been watching what he eats and has lost over 30 pounds  Insomnia he reports that he is very tired when he gets home after working 10-hour days he goes to bed about 7 or 8 and then he wakes up at midnight cannot go back to sleep he has to get up the bradycardia because he was in the work-up 4. He has been taking 5 mg of melatonin. He also has been using some Tylenol PM  3. Health maintenance;  normal PSA;  he declines any other HM;    4.   Sleep apnea;    KADEN     Sleeps well with no daytime sleepiness;  benefits from use      sleep apnea  wears a mask consistently  at night and with naps;    benefits from the use with has less daytime sleepiness and sleeps very well at night;   Med supply is home medical     Past Medical History:   Diagnosis Date    Hypertension       History reviewed. No pertinent surgical history.     Vitals 2023 2022 3/14/2022 2020 2020    SYSTOLIC 793 807 434 [phone]   DIASTOLIC 88 88 78 - 98 503   Pulse 76 90 87 - 98 113   Temp - 97.5 97.9 98.4 - 98.9   Resp - - 18 - - -   SpO2 98 95 97 - - -   Weight 285 lb 318 lb 320 lb 303 lb 303 lb 300 lb 9.6 oz   Height 5' 7\" 5' 8\" 5' 6\" 5' 6\" 5' 6\" 5' 6\"   Body mass index 44.63 kg/m2 48.35 kg/m2 51.64 kg/m2 48.9 kg/m2 48.9 kg/m2 48.51 kg/m2   Some recent data might be hidden       Family History   Adopted: Yes       Social History Tobacco Use    Smoking status: Never    Smokeless tobacco: Never   Substance Use Topics    Alcohol use: No      Current Outpatient Medications   Medication Sig Dispense Refill    lisinopril (PRINIVIL;ZESTRIL) 40 MG tablet Take 1 tablet by mouth daily 30 tablet 5    hydroCHLOROthiazide (HYDRODIURIL) 25 MG tablet Take 1 tablet by mouth daily 30 tablet 5    Multiple Vitamins-Minerals (CENTRUM SILVER PO) Take by mouth (Patient not taking: Reported on 3/14/2022)      Omega-3 Fatty Acids (FISH OIL) 1000 MG CAPS Take 1,000 mg by mouth (Patient not taking: Reported on 3/14/2022)      CPAP Machine MISC by Does not apply route CPAP supplies 1 each 0     No current facility-administered medications for this visit.      Allergies   Allergen Reactions    Aspirin Swelling    Nsaids Hives and Itching       Health Maintenance   Topic Date Due    Colorectal Cancer Screen  03/08/2022    COVID-19 Vaccine (3 - Booster for Orie Huguenin series) 01/24/2024 (Originally 12/24/2021)    Shingles vaccine (1 of 2) 01/30/2024 (Originally 8/13/2010)    DTaP/Tdap/Td vaccine (1 - Tdap) 02/20/2024 (Originally 8/13/1979)    Depression Screen  03/14/2023    Lipids  01/20/2028    Flu vaccine  Completed    Hepatitis C screen  Completed    HIV screen  Completed    Hepatitis A vaccine  Aged Out    Hib vaccine  Aged Out    Meningococcal (ACWY) vaccine  Aged Out    Pneumococcal 0-64 years Vaccine  Aged Out       No results found for: LABA1C  Lab Results   Component Value Date    PSA 1.37 01/20/2023    PSA 7.84 (H) 08/02/2021    PSA 1.62 08/28/2020     TSH   Date Value Ref Range Status   01/20/2023 1.850 0.270 - 4.200 uIU/mL Final   ]  Lab Results   Component Value Date     01/20/2023    K 4.9 01/20/2023    CL 96 (L) 01/20/2023    CO2 28 01/20/2023    BUN 17 01/20/2023    CREATININE 1.0 01/20/2023    GLUCOSE 94 01/20/2023    CALCIUM 9.9 01/20/2023    PROT 7.3 01/20/2023    LABALBU 4.7 01/20/2023    BILITOT 0.7 01/20/2023    ALKPHOS 75 01/20/2023    AST 18 01/20/2023    ALT 23 01/20/2023    LABGLOM >60 01/20/2023    GFRAA >59 08/02/2021     Lab Results   Component Value Date    CHOL 192 01/20/2023    CHOL 186 08/02/2021    CHOL 181 08/28/2020     Lab Results   Component Value Date    TRIG 107 01/20/2023    TRIG 147 08/02/2021    TRIG 89 08/28/2020     Lab Results   Component Value Date    HDL 55 01/20/2023    HDL 31 (L) 08/02/2021    HDL 55 08/28/2020     Lab Results   Component Value Date    LDLCALC 116 01/20/2023    LDLCALC 126 08/02/2021    LDLCALC 108 08/28/2020     Lab Results   Component Value Date/Time     01/20/2023 07:22 AM    K 4.9 01/20/2023 07:22 AM    CL 96 01/20/2023 07:22 AM    CO2 28 01/20/2023 07:22 AM    BUN 17 01/20/2023 07:22 AM    CREATININE 1.0 01/20/2023 07:22 AM    GLUCOSE 94 01/20/2023 07:22 AM    CALCIUM 9.9 01/20/2023 07:22 AM      Lab Results   Component Value Date    WBC 6.4 01/20/2023    HGB 16.1 01/20/2023    HCT 50.4 01/20/2023    MCV 83.2 01/20/2023     01/20/2023    LYMPHOPCT 23.6 01/20/2023    RBC 6.06 01/20/2023    MCH 26.6 (L) 01/20/2023    MCHC 31.9 (L) 01/20/2023    RDW 14.0 01/20/2023     Lab Results   Component Value Date    VITD25 29.4 (L) 08/02/2021     Labs reviewed from 1/20/2023  Diagnostics reviewed from 20107 colon    Subjective:      Review of Systems   Constitutional:  Negative for fatigue, fever and unexpected weight change. HENT:  Negative for ear discharge, ear pain, mouth sores, sore throat and trouble swallowing. Eyes:  Negative for discharge, itching and visual disturbance. Respiratory:  Negative for cough, choking, shortness of breath, wheezing and stridor. Cardiovascular:  Negative for chest pain, palpitations and leg swelling. Gastrointestinal:  Negative for abdominal distention, abdominal pain, blood in stool, constipation, diarrhea, nausea and vomiting. Endocrine: Negative for cold intolerance, polydipsia and polyuria.    Genitourinary:  Negative for difficulty urinating, dysuria, frequency and urgency. Musculoskeletal:  Negative for arthralgias and gait problem. Skin:  Negative for color change and rash. Allergic/Immunologic: Negative for food allergies and immunocompromised state. Neurological:  Negative for dizziness, tremors, syncope, speech difficulty, weakness and headaches. Hematological:  Negative for adenopathy. Does not bruise/bleed easily. Psychiatric/Behavioral:  Positive for sleep disturbance. Negative for confusion and hallucinations. Objective:     Physical Exam  Constitutional:       General: He is not in acute distress. Appearance: He is well-developed. He is obese. HENT:      Head: Normocephalic and atraumatic. Eyes:      General: No scleral icterus. Right eye: No discharge. Left eye: No discharge. Pupils: Pupils are equal, round, and reactive to light. Neck:      Thyroid: No thyromegaly. Vascular: No JVD. Cardiovascular:      Rate and Rhythm: Normal rate and regular rhythm. Heart sounds: Normal heart sounds. No murmur heard. Pulmonary:      Effort: Pulmonary effort is normal. No respiratory distress. Breath sounds: Normal breath sounds. No wheezing or rales. Abdominal:      General: Bowel sounds are normal. There is no distension. Palpations: Abdomen is soft. There is no mass. Tenderness: There is no abdominal tenderness. There is no guarding or rebound. Musculoskeletal:         General: No tenderness. Normal range of motion. Cervical back: Normal range of motion and neck supple. Skin:     General: Skin is warm and dry. Findings: No erythema or rash. Neurological:      Mental Status: He is alert and oriented to person, place, and time. Cranial Nerves: No cranial nerve deficit. Coordination: Coordination normal.      Deep Tendon Reflexes: Reflexes are normal and symmetric. Reflexes normal.   Psychiatric:         Mood and Affect: Mood is not depressed. Behavior: Behavior normal.         Thought Content: Thought content normal.         Judgment: Judgment normal.   Obesity continue with your weight loss  /88   Pulse 76   Ht 5' 7\" (1.702 m)   Wt 285 lb (129.3 kg)   SpO2 98%   BMI 44.64 kg/m²           Assessment:      Problem List       Class 3 severe obesity due to excess calories without serious comorbidity with body mass index (BMI) of 40.0 to 44.9 in adult [facility])     He is recently lost 30 pounds          Other insomnia     We will use Tylenol PM and you can take up to 20 mg of melatonin          KADEN (obstructive sleep apnea)     He gets supplies offline             Plan:        Patient given educational materials - see patient instructions. Discussed use, benefit, and side effects of prescribed medications. Allpatient questions answered. Pt voiced understanding. Reviewed health maintenance. Instructed to continue current medications, diet and exercise. Patient agreed with treatment plan. Follow up as directed. MEDICATIONS:  No orders of the defined types were placed in this encounter. ORDERS:  No orders of the defined types were placed in this encounter. Follow-up:  No follow-ups on file. PATIENT INSTRUCTIONS:  Patient Instructions   1. #1 obesity continue with weight loss  2. Insomnia he can take the Tylenol PM every night and up to 20 mg of melatonin Next #3 health maintenance PSA was normal  Electronically signed by SHARON Nichols on 1/30/2023 at 3:19 PM    @    EMRDrrayray/transcription disclaimer:  Much of this encounter note is electronic transcription/translation of spoken language to printed texts. The electronic translation of spoken language may be erroneous, or at times,nonsensical words or phrases may be inadvertently transcribed.   Although I have reviewed the note for such errors, some may still exist.

## 2023-04-03 RX ORDER — HYDROCHLOROTHIAZIDE 25 MG/1
25 TABLET ORAL DAILY
Qty: 30 TABLET | Refills: 5 | Status: SHIPPED | OUTPATIENT
Start: 2023-04-03 | End: 2023-06-02

## 2023-04-03 NOTE — TELEPHONE ENCOUNTER
Luis Dasilva called requesting a refill of the below medication which has been pended for you:     Requested Prescriptions     Pending Prescriptions Disp Refills    hydroCHLOROthiazide (HYDRODIURIL) 25 MG tablet 30 tablet 5     Sig: Take 1 tablet by mouth daily       Last Appointment Date: 1/30/2023  Next Appointment Date: 7/31/2023    Allergies   Allergen Reactions    Aspirin Swelling    Nsaids Hives and Itching

## 2023-04-24 RX ORDER — CIPROFLOXACIN 500 MG/1
500 TABLET, FILM COATED ORAL 2 TIMES DAILY
Qty: 20 TABLET | Refills: 0 | Status: SHIPPED | OUTPATIENT
Start: 2023-04-24 | End: 2023-05-04

## 2023-11-03 NOTE — PROGRESS NOTES
[facility] INTER[facility] MEDICINE  [facility] [facility] [address]  Dept: [phone]  Dept Fax: [phone]  Loc: [phone]    Juan Pittman is a 62 y.o. male who presents today for his medical conditions/complaints as noted below. Juan Pittman is c/opal Fever (follow up from friday, no fever)        HPI:     HPI   1. Fever;  None for over 48 hours; brought his logs of his of his temperatures which started last week at 104+ for the last 48 hours he has had nothing over 100. Is originally started middle of last week when he had a fever of 104.5 body aches headache and fatigue. Most of this seemed to be viral in nature. We did give him IV antibiotics and prescription prior to getting a CBC back but we decided to go ahead and discontinue that given his fever was that high. I saw him again on Friday he was somewhat better but still with a headache. We have encouraged him to go to the ER for possible spinal tap. He declined. Over the weekend now he is felt much better slowly. Today he tells me his appetite is back to normal and he is pretty much back to his usual activity. He does look better today his eyes are more bright he is more active  #2 elevated  LFTs today elevated LFTs today are still slightly higher than they were last week. But the blood count still looks like a viral syndrome we will just repeat these later  Chief Complaint   Patient presents with    Fever     follow up from friday, no fever       Past Medical History:   Diagnosis Date    Hypertension       History reviewed. No pertinent surgical history.     Vitals 6/17/2019 6/14/2019 6/12/2019 3/4/2019 6/20/2018 8/35/0770   SYSTOLIC 984 90 185 872 279 387   DIASTOLIC 68 54 68 66 68 80   Pulse 80 88 95 93 87 85   Temp 97.6 99.5 100.1 - - 99.4   Resp - 18 18 20 16 16   SpO2 98 94 94 95 94 94   Weight 294 lb 293 lb 297 lb 304 lb 293 lb 287 lb   Height 5' 6\" 5' 6\" 5' 6\" 5' 6\" 5' 7\" 5' 7\" Problem: Prexisting or High Potential for Compromised Skin Integrity  Goal: Skin integrity is maintained or improved  Description: INTERVENTIONS:  - Identify patients at risk for skin breakdown  - Assess and monitor skin integrity  - Assess and monitor nutrition and hydration status  - Monitor labs   - Assess for incontinence   - Turn and reposition patient  - Assist with mobility/ambulation  - Relieve pressure over bony prominences  - Avoid friction and shearing  - Provide appropriate hygiene as needed including keeping skin clean and dry  - Evaluate need for skin moisturizer/barrier cream  - Collaborate with interdisciplinary team   - Patient/family teaching  - Consider wound care consult   Outcome: Progressing BMI (wt*703/ht~2) 47.45 kg/m2 47.29 kg/m2 47.93 kg/m2 49.06 kg/m2 45.89 kg/m2 44.95 kg/m2       Family History   Adopted: Yes       Social History     Tobacco Use    Smoking status: Never Smoker    Smokeless tobacco: Never Used   Substance Use Topics    Alcohol use: No      Current Outpatient Medications   Medication Sig Dispense Refill    promethazine (PHENERGAN) 25 MG tablet Take 1 tablet by mouth every 6 hours as needed for Nausea 28 tablet 2    cefdinir (OMNICEF) 300 MG capsule Take 1 capsule by mouth 2 times daily for 7 days 14 capsule 0    lisinopril (PRINIVIL;ZESTRIL) 40 MG tablet TAKE ONE TABLET BY MOUTH EVERY DAY 30 tablet 5    hydrochlorothiazide (HYDRODIURIL) 25 MG tablet Take 1 tablet by mouth daily 30 tablet 5    Omega-3 Fatty Acids (FISH OIL) 1000 MG CAPS Take 1,000 mg by mouth      CPAP Machine MISC by Does not apply route CPAP supplies 1 each 0     No current facility-administered medications for this visit.       Allergies   Allergen Reactions    Aspirin Swelling    Nsaids Hives and Itching       Health Maintenance   Topic Date Due    HIV screen  08/13/1975    DTaP/Tdap/Td vaccine (1 - Tdap) 08/13/1979    Lipid screen  08/13/2000    Diabetes screen  08/13/2000    Shingles Vaccine (1 of 2) 08/13/2010    Colon cancer screen colonoscopy  08/13/2010    Potassium monitoring  06/14/2020    Creatinine monitoring  06/14/2020    Flu vaccine  Completed    Hepatitis C screen  Completed    Pneumococcal 0-64 years Vaccine  Aged Out       No results found for: LABA1C  Lab Results   Component Value Date    PSA 1.05 06/12/2019     No results found for: TSH]  Lab Results   Component Value Date     06/17/2019    K 4.0 06/17/2019    CL 95 (L) 06/17/2019    CO2 33 (H) 06/17/2019    BUN 18 06/17/2019    CREATININE 0.9 06/17/2019    GLUCOSE 109 06/17/2019    CALCIUM 9.1 06/17/2019    PROT 7.0 06/17/2019    LABALBU 3.9 06/17/2019    BILITOT 0.5 06/17/2019    ALKPHOS 66 06/17/2019    AST 54 (H) confusion and hallucinations. Objective:     Physical Exam   Constitutional: He is oriented to person, place, and time. He appears well-developed and well-nourished. No distress. HENT:   Head: Normocephalic and atraumatic. Eyes: Pupils are equal, round, and reactive to light. Right eye exhibits no discharge. Left eye exhibits no discharge. No scleral icterus. Neck: Normal range of motion. Neck supple. No JVD present. No thyromegaly present. Cardiovascular: Normal rate, regular rhythm and normal heart sounds. No murmur heard. Pulmonary/Chest: Effort normal and breath sounds normal. No respiratory distress. He has no wheezes. He has no rales. Abdominal: Soft. Bowel sounds are normal. He exhibits no distension and no mass. There is no tenderness. There is no rebound and no guarding. Musculoskeletal: Normal range of motion. He exhibits no edema or tenderness. Neurological: He is alert and oriented to person, place, and time. He has normal reflexes. He displays normal reflexes. No cranial nerve deficit. Coordination normal.   Skin: Skin is warm and dry. No rash noted. No erythema. Psychiatric: His behavior is normal. Judgment and thought content normal. He does not exhibit a depressed mood. /68   Pulse 80   Temp 97.6 °F (36.4 °C)   Ht 5' 6\" (1.676 m)   Wt 294 lb (133.4 kg)   SpO2 98%   BMI 47.45 kg/m²     Assessment:       Diagnosis Orders   1. Elevated LFTs  Comprehensive Metabolic Panel   2. Viral syndrome       Labs reviewedfrom today     Plan:        Patient given educational materials - see patient instructions. Discussed use, benefit, and side effects of prescribed medications. Allpatient questions answered. Pt voiced understanding. Reviewed health maintenance. Instructed to continue current medications, diet and exercise. Patient agreed with treatment plan. Follow up as directed.    MEDICATIONS:  No orders of the defined types were placed in this encounter. ORDERS:  Orders Placed This Encounter   Procedures    Comprehensive Metabolic Panel       Follow-up:  No follow-ups on file. PATIENT INSTRUCTIONS:  Patient Instructions   1. Fever   2.   body aches   #3 nausea  4 elevated LFTs  All of the symptoms have essentially subsided. His blood work is somewhat better today other than slightly elevated LFTs more so than on Friday. Has all of his labs looking like is going back to normal we will just repeat this in a couple weeks to be sure that we are trending in the right direction. All of his other labs his PSA and all the hepatitis panels have been within normal and urine has cleared    Electronically signed by SHARON Clarke on 6/17/2019 at 12:37 PM    EMRDragon/transcription disclaimer:  Much of this encounter note is electronic transcription/translation of spoken language to printed texts. The electronic translation of spoken language may be erroneous, or at times,nonsensical words or phrases may be inadvertently transcribed.   Although I have reviewed the note for such errors, some may still exist.

## 2024-01-16 ENCOUNTER — OFFICE VISIT (OUTPATIENT)
Dept: INTERNAL MEDICINE | Age: 64
End: 2024-01-16

## 2024-01-16 VITALS
DIASTOLIC BLOOD PRESSURE: 78 MMHG | TEMPERATURE: 99.9 F | SYSTOLIC BLOOD PRESSURE: 119 MMHG | HEART RATE: 87 BPM | OXYGEN SATURATION: 93 %

## 2024-01-16 DIAGNOSIS — J01.90 ACUTE BACTERIAL SINUSITIS: ICD-10-CM

## 2024-01-16 DIAGNOSIS — B96.89 ACUTE BACTERIAL SINUSITIS: ICD-10-CM

## 2024-01-16 DIAGNOSIS — I10 ESSENTIAL HYPERTENSION: ICD-10-CM

## 2024-01-16 DIAGNOSIS — R05.9 COUGH, UNSPECIFIED TYPE: Primary | ICD-10-CM

## 2024-01-16 LAB
INFLUENZA A ANTIGEN, POC: NEGATIVE
INFLUENZA B ANTIGEN, POC: NEGATIVE
LOT EXPIRE DATE: NORMAL
LOT KIT NUMBER: NORMAL
RSV ANTIGEN: NORMAL
SARS-COV-2, POC: NORMAL
VALID INTERNAL CONTROL: NORMAL
VENDOR AND KIT NAME POC: NORMAL

## 2024-01-16 PROCEDURE — 96372 THER/PROPH/DIAG INJ SC/IM: CPT | Performed by: NURSE PRACTITIONER

## 2024-01-16 PROCEDURE — 3074F SYST BP LT 130 MM HG: CPT | Performed by: NURSE PRACTITIONER

## 2024-01-16 PROCEDURE — 99213 OFFICE O/P EST LOW 20 MIN: CPT | Performed by: NURSE PRACTITIONER

## 2024-01-16 PROCEDURE — 3078F DIAST BP <80 MM HG: CPT | Performed by: NURSE PRACTITIONER

## 2024-01-16 RX ORDER — LISINOPRIL 40 MG/1
40 TABLET ORAL DAILY
Qty: 30 TABLET | Refills: 2 | Status: SHIPPED | OUTPATIENT
Start: 2024-01-16 | End: 2024-01-16

## 2024-01-16 RX ORDER — CODEINE PHOSPHATE/GUAIFENESIN 10-100MG/5
5 LIQUID (ML) ORAL 2 TIMES DAILY PRN
Qty: 118 ML | Refills: 0 | Status: SHIPPED | OUTPATIENT
Start: 2024-01-16 | End: 2024-01-28

## 2024-01-16 RX ORDER — METHYLPREDNISOLONE ACETATE 80 MG/ML
80 INJECTION, SUSPENSION INTRA-ARTICULAR; INTRALESIONAL; INTRAMUSCULAR; SOFT TISSUE ONCE
Status: COMPLETED | OUTPATIENT
Start: 2024-01-16 | End: 2024-01-16

## 2024-01-16 RX ORDER — CEFDINIR 300 MG/1
300 CAPSULE ORAL 2 TIMES DAILY
Qty: 14 CAPSULE | Refills: 1 | Status: SHIPPED | OUTPATIENT
Start: 2024-01-16 | End: 2024-01-30

## 2024-01-16 RX ORDER — LISINOPRIL 40 MG/1
40 TABLET ORAL DAILY
Qty: 90 TABLET | Refills: 1 | Status: SHIPPED | OUTPATIENT
Start: 2024-01-16 | End: 2024-07-14

## 2024-01-16 RX ADMIN — METHYLPREDNISOLONE ACETATE 80 MG: 80 INJECTION, SUSPENSION INTRA-ARTICULAR; INTRALESIONAL; INTRAMUSCULAR; SOFT TISSUE at 15:38

## 2024-01-16 ASSESSMENT — ENCOUNTER SYMPTOMS
CHOKING: 0
DIARRHEA: 0
VOMITING: 0
ABDOMINAL DISTENTION: 0
SHORTNESS OF BREATH: 0
STRIDOR: 0
CONSTIPATION: 0
ABDOMINAL PAIN: 0
WHEEZING: 0
SORE THROAT: 1
EYE DISCHARGE: 0
BLOOD IN STOOL: 0
TROUBLE SWALLOWING: 0
COLOR CHANGE: 0
COUGH: 1
NAUSEA: 0
EYE ITCHING: 0

## 2024-01-16 NOTE — PATIENT INSTRUCTIONS
Acute sinusitis  Medrol     80  IM   Cefdinir 300 twice  a day for 7 days  get 2 doses in today    Saline nasal spray 4 times a day both nares for 7 days  Steroid spray, rhinocort, nasocort, nasonex, flonase twice daily about 5 minutes after the saline   mucinex 1200 mg twice daily for 7 days with plenty of water  Delsym cough syrup up to 3 times daily as needed for cough   Ricola cough drops, honey lemon in pink bag;  has echanesia to help build your immunity   9.. cherra tussin  for cough;

## 2024-01-16 NOTE — PROGRESS NOTES
[facility]  [facility] INTERNAL MEDICINE  [address] [place] [address] [address]IVE  [address]  [place] [address]  Dept: [phone]  Dept Fax: [phone]  Loc: [phone]    Kar Stevenson (:  1960) is a 63 y.o. male,Established patient  with green, here for evaluation of the following chief complaint(s): No chief complaint on file.      Kar Stevenson is a 63 y.o. male who presents today for his medical conditions/complaints as noted below.  Kar Stevenson is c/opal No chief complaint on file.        HPI:   No chief complaint on file.    @pt is alone     HPI    Head congestion;  for several days;  he has been sick for about 10 days;  not getting ;  he has been using mucinex and saline spray;    2. Sore throat with cough and low grade temp;  he has been using ibuprofen     Past Medical History:   Diagnosis Date    Hypertension       No past surgical history on file.        2023    11:00 AM 2023     2:26 PM 2022     3:34 PM 3/14/2022     9:17 AM 2020     3:09 PM 2020    12:37 PM   Vitals   SYSTOLIC 118 138 138 136  150   DIASTOLIC 70 88 88 78  98   Pulse 92 76 90 87  98   Temp 100.9 °F (38.3 °C)  97.5 °F (36.4 °C) 97.9 °F (36.6 °C) 98.4 °F (36.9 °C)    Respirations    18     SpO2 94 % 98 % 95 % 97 %     Weight - Scale  285 lb 318 lb 320 lb 303 lb 303 lb   Height  5' 7\" 5' 8\" 5' 6\" 5' 6\" 5' 6\"   Body Mass Index  44.63 kg/m2 48.35 kg/m2 51.64 kg/m2 48.9 kg/m2 48.9 kg/m2       Family History   Adopted: Yes       Social History     Tobacco Use    Smoking status: Never    Smokeless tobacco: Never   Substance Use Topics    Alcohol use: No      Current Outpatient Medications   Medication Sig Dispense Refill    hydroCHLOROthiazide (HYDRODIURIL) 25 MG tablet Take 1 tablet by mouth daily 30 tablet 5    lisinopril (PRINIVIL;ZESTRIL) 40 MG tablet Take 1 tablet by mouth daily 30 tablet 5    Multiple Vitamins-Minerals (CENTRUM SILVER PO) Take by mouth (Patient not taking: Reported on

## 2024-03-18 ENCOUNTER — OFFICE VISIT (OUTPATIENT)
Dept: INTERNAL MEDICINE | Age: 64
End: 2024-03-18

## 2024-03-18 VITALS
HEART RATE: 96 BPM | OXYGEN SATURATION: 93 % | HEIGHT: 67 IN | DIASTOLIC BLOOD PRESSURE: 70 MMHG | SYSTOLIC BLOOD PRESSURE: 130 MMHG | BODY MASS INDEX: 44.64 KG/M2

## 2024-03-18 DIAGNOSIS — R30.0 DYSURIA: ICD-10-CM

## 2024-03-18 DIAGNOSIS — R30.0 DYSURIA: Primary | ICD-10-CM

## 2024-03-18 DIAGNOSIS — N30.01 ACUTE CYSTITIS WITH HEMATURIA: ICD-10-CM

## 2024-03-18 PROBLEM — N30.00 ACUTE CYSTITIS: Status: ACTIVE | Noted: 2024-03-18

## 2024-03-18 LAB
ALBUMIN SERPL-MCNC: 4.3 G/DL (ref 3.5–5.2)
ALP SERPL-CCNC: 62 U/L (ref 40–130)
ALT SERPL-CCNC: 18 U/L (ref 5–41)
ANION GAP SERPL CALCULATED.3IONS-SCNC: 9 MMOL/L (ref 7–19)
APPEARANCE FLUID: NORMAL
AST SERPL-CCNC: 12 U/L (ref 5–40)
BASOPHILS # BLD: 0.1 K/UL (ref 0–0.2)
BASOPHILS NFR BLD: 0.6 % (ref 0–1)
BILIRUB SERPL-MCNC: 0.9 MG/DL (ref 0.2–1.2)
BILIRUBIN, POC: NORMAL
BLOOD URINE, POC: NORMAL
BUN SERPL-MCNC: 19 MG/DL (ref 8–23)
CALCIUM SERPL-MCNC: 9.6 MG/DL (ref 8.8–10.2)
CHLORIDE SERPL-SCNC: 94 MMOL/L (ref 98–111)
CLARITY, POC: NORMAL
CO2 SERPL-SCNC: 32 MMOL/L (ref 22–29)
COLOR, POC: NORMAL
CREAT SERPL-MCNC: 1.3 MG/DL (ref 0.5–1.2)
EOSINOPHIL # BLD: 0.1 K/UL (ref 0–0.6)
EOSINOPHIL NFR BLD: 0.7 % (ref 0–5)
ERYTHROCYTE [DISTWIDTH] IN BLOOD BY AUTOMATED COUNT: 13.8 % (ref 11.5–14.5)
GLUCOSE SERPL-MCNC: 89 MG/DL (ref 74–109)
GLUCOSE URINE, POC: NORMAL
HCT VFR BLD AUTO: 46.9 % (ref 42–52)
HGB BLD-MCNC: 14.8 G/DL (ref 14–18)
IMM GRANULOCYTES # BLD: 0.1 K/UL
KETONES, POC: NORMAL
LEUKOCYTE EST, POC: NORMAL
LYMPHOCYTES # BLD: 1.8 K/UL (ref 1.1–4.5)
LYMPHOCYTES NFR BLD: 12.3 % (ref 20–40)
MCH RBC QN AUTO: 26.2 PG (ref 27–31)
MCHC RBC AUTO-ENTMCNC: 31.6 G/DL (ref 33–37)
MCV RBC AUTO: 83.2 FL (ref 80–94)
MONOCYTES # BLD: 1.5 K/UL (ref 0–0.9)
MONOCYTES NFR BLD: 10.3 % (ref 0–10)
NEUTROPHILS # BLD: 10.9 K/UL (ref 1.5–7.5)
NEUTS SEG NFR BLD: 75.7 % (ref 50–65)
NITRITE, POC: NORMAL
PH, POC: 5.5
PLATELET # BLD AUTO: 281 K/UL (ref 130–400)
PMV BLD AUTO: 9.2 FL (ref 9.4–12.4)
POTASSIUM SERPL-SCNC: 4.5 MMOL/L (ref 3.5–5)
PROT SERPL-MCNC: 7.5 G/DL (ref 6.6–8.7)
PROTEIN, POC: NORMAL
RBC # BLD AUTO: 5.64 M/UL (ref 4.7–6.1)
SODIUM SERPL-SCNC: 135 MMOL/L (ref 136–145)
SPECIFIC GRAVITY, POC: 1.03
UROBILINOGEN, POC: 1
WBC # BLD AUTO: 14.4 K/UL (ref 4.8–10.8)

## 2024-03-18 PROCEDURE — 3075F SYST BP GE 130 - 139MM HG: CPT | Performed by: NURSE PRACTITIONER

## 2024-03-18 PROCEDURE — 81002 URINALYSIS NONAUTO W/O SCOPE: CPT | Performed by: NURSE PRACTITIONER

## 2024-03-18 PROCEDURE — 99214 OFFICE O/P EST MOD 30 MIN: CPT | Performed by: NURSE PRACTITIONER

## 2024-03-18 PROCEDURE — 3078F DIAST BP <80 MM HG: CPT | Performed by: NURSE PRACTITIONER

## 2024-03-18 RX ORDER — CIPROFLOXACIN 500 MG/1
500 TABLET, FILM COATED ORAL 2 TIMES DAILY
Qty: 20 TABLET | Refills: 0 | Status: SHIPPED | OUTPATIENT
Start: 2024-03-18 | End: 2024-03-28

## 2024-03-18 ASSESSMENT — ENCOUNTER SYMPTOMS
CHOKING: 0
SORE THROAT: 0
STRIDOR: 0
COLOR CHANGE: 0
ABDOMINAL PAIN: 0
VOMITING: 0
NAUSEA: 0
EYE ITCHING: 0
COUGH: 0
CONSTIPATION: 0
EYE DISCHARGE: 0
BLOOD IN STOOL: 0
TROUBLE SWALLOWING: 0
DIARRHEA: 0
ABDOMINAL DISTENTION: 0
WHEEZING: 0
SHORTNESS OF BREATH: 0

## 2024-03-18 NOTE — PROGRESS NOTES
[facility]  [facility] INTERNAL MEDICINE  [address] [place] [address] DRIVE  [address]  [place] [address]  Dept: [phone]  Dept Fax: [phone]  Loc: [phone]    Kar Stevenson (:  1960) is a 63 y.o. male,Established patient  with green, here for evaluation of the following chief complaint(s): Other (Urgency to use bathroom and barely urinating )      Kar Stevenson is a 63 y.o. male who presents today for his medical conditions/complaints as noted below.  Kar Stevenson is c/opal Other (Urgency to use bathroom and barely urinating )        HPI:     Chief Complaint   Patient presents with    Other     Urgency to use bathroom and barely urinating      @pt is alone       HPI    Dysuria;  for the last 3 days;  he had chills last night;    History of prostatitis  usally has it about 1 time a year;          Past Medical History:   Diagnosis Date    Hypertension       No past surgical history on file.        3/18/2024     9:52 AM 2024     3:07 PM 2023    11:00 AM 2023     2:26 PM 2022     3:34 PM 3/14/2022     9:17 AM   Vitals   SYSTOLIC 130 119 118 138 138 136   DIASTOLIC 70 78 70 88 88 78   Pulse 96 87 92 76 90 87   Temp  99.9 °F (37.7 °C) 100.9 °F (38.3 °C)  97.5 °F (36.4 °C) 97.9 °F (36.6 °C)   Respirations      18   SpO2 93 % 93 % 94 % 98 % 95 % 97 %   Weight - Scale    285 lb 318 lb 320 lb   Height 5' 7\"   5' 7\" 5' 8\" 5' 6\"   Body Mass Index    44.63 kg/m2 48.35 kg/m2 51.64 kg/m2       Family History   Adopted: Yes       Social History     Tobacco Use    Smoking status: Never    Smokeless tobacco: Never   Substance Use Topics    Alcohol use: No      Current Outpatient Medications   Medication Sig Dispense Refill    ciprofloxacin (CIPRO) 500 MG tablet Take 1 tablet by mouth 2 times daily for 10 days 20 tablet 0    lisinopril (PRINIVIL;ZESTRIL) 40 MG tablet Take 1 tablet by mouth daily 90 tablet 1    hydroCHLOROthiazide (HYDRODIURIL) 25 MG tablet Take 1 tablet by mouth

## 2024-03-18 NOTE — PATIENT INSTRUCTIONS
Acute UTI; with fever/chills;   cipro 500 twice daily for 10 days due to history and mild symptoms of prostatitis as well; will culture urine as well   Hematuria and very dark urine;  we will check labs today  we are covering for prostatitis as well    PATIENT INSTRUCTIONS:      IT IS VERY IMPORTANT THAT YOU GET YOUR LABS AT LEAST 1 DAY BEFORE YOUR APPT.  WE MAY HAVE TO RESCHEDULE YOU IF YOU WE DONT HAVE THE RESULTS;      WE CAN DISCUSS THE LABS ON THE DAY OF YOUR OFFICE VISIT AND MAKE CHANGES TOGETHER WHILE YOU ARE HERE.  THEN YOU CAN LEAVE WITH A COPY OF THOSE CHANGES.     CALLING YOU BACK WITH ROUTINE RESULTS IS VERY TIME CONSUMING;  I HAVE TO REVIEW THE LABS, THEN YOUR PAST LABS AND OV, THEN MAKE A NOTE TO THE NURSES TO CALL YOU, OR I CALL MYSELF WITH EXTREME ABNORMALITIES WHICH CAN BE TIME CONSUMING AS WELL;  WHEN I HAVE TO TAKE TIME OUT OF ANOTHER DAY FOR YOUR RESULTS THAT IS TIME AWAY FROM OTHER PATIENTS AND DISCUSSING RESULTS OVER THE PHONE IS JUST NOT A GOOD IDEA;  WHILE HERE WE CAN WRITE DOWN THE CHANGES BEFORE YOU LEAVE THE OFFICE;      Life simple 7  1) Manage blood pressure - high blood pressure is a major risk factor for heart disease and stroke. Keeping blood pressure in health range reduces strain on your heart, arteries and kidneys.  Blood pressure goal is less than 130/80.   2) Control cholesterol - contributes to plaque, which can clog arteries and lead to heart disease and stroke. When you control your cholesterol you are giving your arteries their best chance to remain clear.  It is recommended that you get cholesterol lab work done once a year.  3) Reduce blood sugar - most of the food we eat is turning into glucose or blood sugar that our body uses for energy.  Over time, high levels of blood sugar can damage your heart, kidneys, eyes and nerves.  4) Get active - living an active life is one of the most rewarding gifts you can give yourself and those you love.  Simply put, daily physical

## 2024-03-20 LAB
BACTERIA UR CULT: ABNORMAL
BACTERIA UR CULT: ABNORMAL
ORGANISM: ABNORMAL

## 2024-05-13 DIAGNOSIS — R53.83 FATIGUE, UNSPECIFIED TYPE: Primary | ICD-10-CM

## 2024-05-13 DIAGNOSIS — Z12.5 SCREENING FOR PROSTATE CANCER: ICD-10-CM

## 2024-05-13 DIAGNOSIS — I10 ESSENTIAL HYPERTENSION: ICD-10-CM

## 2024-06-07 DIAGNOSIS — I10 ESSENTIAL HYPERTENSION: ICD-10-CM

## 2024-06-07 DIAGNOSIS — R53.83 FATIGUE, UNSPECIFIED TYPE: ICD-10-CM

## 2024-06-07 DIAGNOSIS — Z12.5 SCREENING FOR PROSTATE CANCER: ICD-10-CM

## 2024-06-07 LAB
25(OH)D3 SERPL-MCNC: 30.4 NG/ML
ALBUMIN SERPL-MCNC: 4 G/DL (ref 3.5–5.2)
ALP SERPL-CCNC: 65 U/L (ref 40–130)
ALT SERPL-CCNC: 16 U/L (ref 5–41)
ANION GAP SERPL CALCULATED.3IONS-SCNC: 10 MMOL/L (ref 7–19)
AST SERPL-CCNC: 13 U/L (ref 5–40)
BACTERIA URNS QL MICRO: NEGATIVE /HPF
BASOPHILS # BLD: 0.1 K/UL (ref 0–0.2)
BASOPHILS NFR BLD: 1.1 % (ref 0–1)
BILIRUB SERPL-MCNC: 0.5 MG/DL (ref 0.2–1.2)
BILIRUB UR QL STRIP: NEGATIVE
BUN SERPL-MCNC: 16 MG/DL (ref 8–23)
CALCIUM SERPL-MCNC: 9.3 MG/DL (ref 8.8–10.2)
CHLORIDE SERPL-SCNC: 101 MMOL/L (ref 98–111)
CHOLEST SERPL-MCNC: 171 MG/DL (ref 160–199)
CLARITY UR: CLEAR
CO2 SERPL-SCNC: 32 MMOL/L (ref 22–29)
COLOR UR: YELLOW
CREAT SERPL-MCNC: 1 MG/DL (ref 0.5–1.2)
CRYSTALS URNS MICRO: NORMAL /HPF
EOSINOPHIL # BLD: 0.2 K/UL (ref 0–0.6)
EOSINOPHIL NFR BLD: 2.4 % (ref 0–5)
EPI CELLS #/AREA URNS AUTO: 1 /HPF (ref 0–5)
ERYTHROCYTE [DISTWIDTH] IN BLOOD BY AUTOMATED COUNT: 13.6 % (ref 11.5–14.5)
GLUCOSE SERPL-MCNC: 95 MG/DL (ref 74–109)
GLUCOSE UR STRIP.AUTO-MCNC: NEGATIVE MG/DL
HDLC SERPL-MCNC: 56 MG/DL (ref 55–121)
HGB BLD-MCNC: 14.8 G/DL (ref 14–18)
HGB UR STRIP.AUTO-MCNC: ABNORMAL MG/L
HYALINE CASTS #/AREA URNS AUTO: 2 /HPF (ref 0–8)
IMM GRANULOCYTES # BLD: 0 K/UL
KETONES UR STRIP.AUTO-MCNC: NEGATIVE MG/DL
LDLC SERPL CALC-MCNC: 102 MG/DL
LEUKOCYTE ESTERASE UR QL STRIP.AUTO: NEGATIVE
LYMPHOCYTES # BLD: 1.4 K/UL (ref 1.1–4.5)
LYMPHOCYTES NFR BLD: 21.5 % (ref 20–40)
MCH RBC QN AUTO: 27 PG (ref 27–31)
MCHC RBC AUTO-ENTMCNC: 31.6 G/DL (ref 33–37)
MCV RBC AUTO: 85.6 FL (ref 80–94)
MONOCYTES # BLD: 0.6 K/UL (ref 0–0.9)
MONOCYTES NFR BLD: 9 % (ref 0–10)
NEUTROPHILS # BLD: 4.3 K/UL (ref 1.5–7.5)
NEUTS SEG NFR BLD: 65.4 % (ref 50–65)
NITRITE UR QL STRIP.AUTO: NEGATIVE
PH UR STRIP.AUTO: 5.5 [PH] (ref 5–8)
PLATELET # BLD AUTO: 254 K/UL (ref 130–400)
PMV BLD AUTO: 9.6 FL (ref 9.4–12.4)
POTASSIUM SERPL-SCNC: 4.4 MMOL/L (ref 3.5–5)
PROT SERPL-MCNC: 7 G/DL (ref 6.6–8.7)
PROT UR STRIP.AUTO-MCNC: NEGATIVE MG/DL
PSA SERPL-MCNC: 1.58 NG/ML (ref 0–4)
RBC # BLD AUTO: 5.48 M/UL (ref 4.7–6.1)
RBC #/AREA URNS AUTO: 2 /HPF (ref 0–4)
SODIUM SERPL-SCNC: 143 MMOL/L (ref 136–145)
SP GR UR STRIP.AUTO: 1.02 (ref 1–1.03)
TRIGL SERPL-MCNC: 66 MG/DL (ref 0–149)
TSH SERPL DL<=0.005 MIU/L-ACNC: 1.46 UIU/ML (ref 0.27–4.2)
UROBILINOGEN UR STRIP.AUTO-MCNC: 0.2 E.U./DL
WBC # BLD AUTO: 6.6 K/UL (ref 4.8–10.8)
WBC #/AREA URNS AUTO: 1 /HPF (ref 0–5)

## 2024-06-12 ENCOUNTER — OFFICE VISIT (OUTPATIENT)
Dept: INTERNAL MEDICINE | Age: 64
End: 2024-06-12

## 2024-06-12 VITALS
HEIGHT: 67 IN | WEIGHT: 285 LBS | SYSTOLIC BLOOD PRESSURE: 140 MMHG | OXYGEN SATURATION: 98 % | HEART RATE: 84 BPM | DIASTOLIC BLOOD PRESSURE: 88 MMHG | BODY MASS INDEX: 44.73 KG/M2

## 2024-06-12 DIAGNOSIS — I10 ESSENTIAL HYPERTENSION: ICD-10-CM

## 2024-06-12 PROBLEM — N41.0 ACUTE PROSTATITIS: Status: RESOLVED | Noted: 2021-08-02 | Resolved: 2024-06-12

## 2024-06-12 PROBLEM — N30.00 ACUTE CYSTITIS: Status: RESOLVED | Noted: 2024-03-18 | Resolved: 2024-06-12

## 2024-06-12 PROCEDURE — 3077F SYST BP >= 140 MM HG: CPT | Performed by: NURSE PRACTITIONER

## 2024-06-12 PROCEDURE — 99214 OFFICE O/P EST MOD 30 MIN: CPT | Performed by: NURSE PRACTITIONER

## 2024-06-12 PROCEDURE — 3079F DIAST BP 80-89 MM HG: CPT | Performed by: NURSE PRACTITIONER

## 2024-06-12 RX ORDER — LISINOPRIL 40 MG/1
40 TABLET ORAL DAILY
Qty: 90 TABLET | Refills: 4 | Status: SHIPPED | OUTPATIENT
Start: 2024-06-12

## 2024-06-12 RX ORDER — CIPROFLOXACIN 500 MG/1
500 TABLET, FILM COATED ORAL 2 TIMES DAILY
Qty: 20 TABLET | Refills: 2 | Status: SHIPPED | OUTPATIENT
Start: 2024-06-12

## 2024-06-12 RX ORDER — PROMETHAZINE HYDROCHLORIDE 25 MG/1
25 TABLET ORAL 4 TIMES DAILY PRN
Qty: 20 TABLET | Refills: 4 | Status: SHIPPED | OUTPATIENT
Start: 2024-06-12

## 2024-06-12 RX ORDER — HYDROCHLOROTHIAZIDE 25 MG/1
25 TABLET ORAL DAILY
Qty: 90 TABLET | Refills: 4 | Status: SHIPPED | OUTPATIENT
Start: 2024-06-12

## 2024-06-12 SDOH — ECONOMIC STABILITY: FOOD INSECURITY: WITHIN THE PAST 12 MONTHS, THE FOOD YOU BOUGHT JUST DIDN'T LAST AND YOU DIDN'T HAVE MONEY TO GET MORE.: NEVER TRUE

## 2024-06-12 SDOH — ECONOMIC STABILITY: FOOD INSECURITY: WITHIN THE PAST 12 MONTHS, YOU WORRIED THAT YOUR FOOD WOULD RUN OUT BEFORE YOU GOT MONEY TO BUY MORE.: NEVER TRUE

## 2024-06-12 SDOH — ECONOMIC STABILITY: HOUSING INSECURITY
IN THE LAST 12 MONTHS, WAS THERE A TIME WHEN YOU DID NOT HAVE A STEADY PLACE TO SLEEP OR SLEPT IN A SHELTER (INCLUDING NOW)?: NO

## 2024-06-12 SDOH — ECONOMIC STABILITY: INCOME INSECURITY: HOW HARD IS IT FOR YOU TO PAY FOR THE VERY BASICS LIKE FOOD, HOUSING, MEDICAL CARE, AND HEATING?: NOT HARD AT ALL

## 2024-06-12 ASSESSMENT — ENCOUNTER SYMPTOMS
SORE THROAT: 0
WHEEZING: 0
ABDOMINAL PAIN: 0
TROUBLE SWALLOWING: 0
BLOOD IN STOOL: 0
EYE DISCHARGE: 0
EYE ITCHING: 0
ABDOMINAL DISTENTION: 0
STRIDOR: 0
DIARRHEA: 0
NAUSEA: 0
COLOR CHANGE: 0
CHOKING: 0
VOMITING: 0
SHORTNESS OF BREATH: 0
CONSTIPATION: 0
COUGH: 0

## 2024-06-12 NOTE — PROGRESS NOTES
SHARON Dubon on 6/12/2024 at 4:13 PM    @    Alexis/transcription disclaimer:  Much of this encounter note is electronic transcription/translation of spoken language to printed texts.  The electronic translation of spoken language may be erroneous, or at times,nonsensical words or phrases may be inadvertently transcribed.  Although I have reviewed the note for such errors, some may still exist.

## 2024-08-08 ENCOUNTER — OFFICE VISIT (OUTPATIENT)
Age: 64
End: 2024-08-08

## 2024-08-08 VITALS
DIASTOLIC BLOOD PRESSURE: 94 MMHG | SYSTOLIC BLOOD PRESSURE: 158 MMHG | OXYGEN SATURATION: 96 % | TEMPERATURE: 98.1 F | RESPIRATION RATE: 18 BRPM | BODY MASS INDEX: 47.93 KG/M2 | HEART RATE: 68 BPM | WEIGHT: 306 LBS

## 2024-08-08 DIAGNOSIS — H66.91 ACUTE OTITIS MEDIA, RIGHT: Primary | ICD-10-CM

## 2024-08-08 DIAGNOSIS — Z75.8 DOES NOT HAVE PRIMARY CARE PROVIDER: ICD-10-CM

## 2024-08-08 DIAGNOSIS — H60.503 ACUTE OTITIS EXTERNA OF BOTH EARS, UNSPECIFIED TYPE: ICD-10-CM

## 2024-08-08 DIAGNOSIS — R03.0 ELEVATED BLOOD PRESSURE READING: ICD-10-CM

## 2024-08-08 PROCEDURE — 99213 OFFICE O/P EST LOW 20 MIN: CPT

## 2024-08-08 PROCEDURE — 3077F SYST BP >= 140 MM HG: CPT

## 2024-08-08 PROCEDURE — 3080F DIAST BP >= 90 MM HG: CPT

## 2024-08-08 RX ORDER — CEFDINIR 300 MG/1
300 CAPSULE ORAL 2 TIMES DAILY
Qty: 14 CAPSULE | Refills: 0 | Status: SHIPPED | OUTPATIENT
Start: 2024-08-08 | End: 2024-08-15

## 2024-08-08 RX ORDER — OFLOXACIN 3 MG/ML
10 SOLUTION AURICULAR (OTIC) 2 TIMES DAILY
Qty: 7 ML | Refills: 1 | Status: SHIPPED | OUTPATIENT
Start: 2024-08-08 | End: 2024-08-15

## 2024-08-08 ASSESSMENT — ENCOUNTER SYMPTOMS
SORE THROAT: 0
EYE PAIN: 0
VOMITING: 0
WHEEZING: 0
DIARRHEA: 0
ABDOMINAL PAIN: 0
COLOR CHANGE: 0
COUGH: 0
CONSTIPATION: 0
STRIDOR: 0
SINUS PAIN: 0
SINUS PRESSURE: 0
ABDOMINAL DISTENTION: 0
NAUSEA: 0
EYE REDNESS: 0
EYE DISCHARGE: 0
APNEA: 0
CHEST TIGHTNESS: 0
CHOKING: 0
FACIAL SWELLING: 0
SHORTNESS OF BREATH: 0
TROUBLE SWALLOWING: 0

## 2024-08-08 NOTE — PROGRESS NOTES
answered. Patient demonstrates understanding and agrees with care plan. Patient was given educational materials - see patient instructions below         Elevated Blood Pressure Reading  Blood pressure was elevated in office today. Discussed this with patient in office.    1st BP reading was 154/96    2nd BP reading was 158/94    Monitor salt intake    Encouraged physical activity.    - Get at least 30 minutes of exercise on most days of the week   - Walking is a good choice   - You also may want to do other activities, such as running, swimming, cycling, or playing tennis or team sports.    Avoid or limit alcohol. Talk to your doctor about whether you can drink any alcohol.    Eat plenty of fruits, vegetables, and low-fat dairy products. Eat less saturated and total fats.    Recommended learning how to check your blood pressure at home.      Orders Placed This Encounter   Procedures    [facility], [place]     Referral Priority:   Routine     Referral Type:   Eval and Treat     Referral Reason:   Specialty Services Required     Number of Visits Requested:   1       No results found for this visit on 08/08/24.    Orders Placed This Encounter   Medications    cefdinir (OMNICEF) 300 MG capsule     Sig: Take 1 capsule by mouth 2 times daily for 7 days     Dispense:  14 capsule     Refill:  0    ofloxacin (FLOXIN) 0.3 % otic solution     Sig: Place 10 drops into both ears 2 times daily for 7 days     Dispense:  7 mL     Refill:  1      New Prescriptions    CEFDINIR (OMNICEF) 300 MG CAPSULE    Take 1 capsule by mouth 2 times daily for 7 days    OFLOXACIN (FLOXIN) 0.3 % OTIC SOLUTION    Place 10 drops into both ears 2 times daily for 7 days        Return if symptoms worsen or fail to improve.         Discussed use, benefits, and side effects of any prescribed medications. All patient questions were answered. Patient voiced understanding of care plan.   Patient was given educational materials - see patient

## 2024-08-08 NOTE — PATIENT INSTRUCTIONS
Cefdinir and ofloxacin ear drops prescribed; Take full course of antibiotics; instill drops to right ear.    Increase fluid intake    Recommended OTC flonase    Recommended OTC claritin or zyrtec    The patient is to follow up with PCP or return to clinic if symptoms worsen/fail to improve.    Any condition can change, despite proper treatment. Therefore, if symptoms still persist or worsen after treatment plan intitated today, either go to the nearest ER, or call PCP, or return to  for further evaluation.    Urgent Care evaluation today is not a substitute for PCP visit. Follow up care is your responsibility to discuss and review this UC visit.      Elevated Blood Pressure Reading  Blood pressure was elevated in office today. Discussed this with patient in office.    1st BP reading was 154/96    2nd BP reading was 158/94    Monitor salt intake    Encouraged physical activity.    - Get at least 30 minutes of exercise on most days of the week   - Walking is a good choice   - You also may want to do other activities, such as running, swimming, cycling, or playing tennis or team sports.    Avoid or limit alcohol. Talk to your doctor about whether you can drink any alcohol.    Eat plenty of fruits, vegetables, and low-fat dairy products. Eat less saturated and total fats.    Recommended learning how to check your blood pressure at home.

## 2025-01-13 ENCOUNTER — OFFICE VISIT (OUTPATIENT)
Dept: FAMILY MEDICINE CLINIC | Facility: CLINIC | Age: 65
End: 2025-01-13
Payer: MEDICAID

## 2025-01-13 ENCOUNTER — TELEPHONE (OUTPATIENT)
Dept: FAMILY MEDICINE CLINIC | Facility: CLINIC | Age: 65
End: 2025-01-13

## 2025-01-13 VITALS
HEIGHT: 67 IN | OXYGEN SATURATION: 98 % | DIASTOLIC BLOOD PRESSURE: 88 MMHG | TEMPERATURE: 97.7 F | SYSTOLIC BLOOD PRESSURE: 137 MMHG | WEIGHT: 301 LBS | HEART RATE: 76 BPM | BODY MASS INDEX: 47.24 KG/M2

## 2025-01-13 DIAGNOSIS — I10 PRIMARY HYPERTENSION: Primary | ICD-10-CM

## 2025-01-13 DIAGNOSIS — E66.01 CLASS 3 SEVERE OBESITY DUE TO EXCESS CALORIES WITH SERIOUS COMORBIDITY AND BODY MASS INDEX (BMI) OF 45.0 TO 49.9 IN ADULT: ICD-10-CM

## 2025-01-13 DIAGNOSIS — R10.9 ACUTE RIGHT FLANK PAIN: ICD-10-CM

## 2025-01-13 DIAGNOSIS — E66.813 CLASS 3 SEVERE OBESITY DUE TO EXCESS CALORIES WITH SERIOUS COMORBIDITY AND BODY MASS INDEX (BMI) OF 45.0 TO 49.9 IN ADULT: ICD-10-CM

## 2025-01-13 PROCEDURE — 99204 OFFICE O/P NEW MOD 45 MIN: CPT | Performed by: INTERNAL MEDICINE

## 2025-01-13 RX ORDER — CYCLOBENZAPRINE HCL 10 MG
10 TABLET ORAL 3 TIMES DAILY PRN
Qty: 30 TABLET | Refills: 2 | Status: SHIPPED | OUTPATIENT
Start: 2025-01-13

## 2025-01-13 RX ORDER — OXYCODONE HYDROCHLORIDE 5 MG/1
5 TABLET ORAL EVERY 4 HOURS PRN
Qty: 18 TABLET | Refills: 0 | Status: SHIPPED | OUTPATIENT
Start: 2025-01-13

## 2025-01-13 RX ORDER — METHYLPREDNISOLONE 4 MG/1
TABLET ORAL
Qty: 21 TABLET | Refills: 0 | Status: SHIPPED | OUTPATIENT
Start: 2025-01-13

## 2025-01-13 NOTE — TELEPHONE ENCOUNTER
Pharmacy Name: [facility]    Pharmacy representative name: CHRISTOPHER    Pharmacy representative phone number: [phone]     What medication are you calling in regards to: OXYCODONE     What question does the pharmacy have: RED FLAG DUE TO PATIENT GOING OUTSIDE OF [place]. NEED TO KNOW IF ACUTE OR CHRONIC    Who is the provider that prescribed the medication: LARA

## 2025-01-13 NOTE — PROGRESS NOTES
Subjective     Chief Complaint   Patient presents with    Sciatica     Pain has been pretty bad for 2 wks. Initially thought it was his hip, went to orthopedic group and they think it is more sciatic pain vs hip pain. Starts on right side and wraps to his back     Establish Care       Sciatica    History of Present Illness  The patient presents for evaluation of right leg pain and weight management.    He has been experiencing intermittent numbness in his right leg for several years, which has recently escalated to persistent pain over the past 2 to 3 weeks. The onset of the pain was sudden, occurring upon awakening 3 weeks ago, and has since become constant, even disrupting his sleep. He initially suspected a hip issue and sought consultation with an orthopedic group this morning. However, the administrative staff suggested a possible sciatic nerve issue due to the absence of groin pain. The pain is severe enough to induce nausea. He reports no history of steroid use, rashes, or soreness. His wife noted a slight swelling on the affected side compared to the other. He is allergic to IBUPROFEN, which causes eye swelling, but can tolerate a dose of 250 mg. He has not previously used muscle relaxants. He has not been hospitalized in the past. Weight-bearing does not exacerbate the pain, but lying on the affected side does. He finds some relief when lying flat on his back. Over-the-counter Tylenol and Tylenol Extended Release 650 mg failed to alleviate the pain. A half tablet of oxycodone 7.5/325 mg provided partial relief, reducing the pain by approximately 50 percent. He has been self-medicating with this regimen for the past 2 days. He expresses concern about the long-term effects of medication and potential addiction.    He initiated a weight loss program 3 months ago, which includes the use of semaglutide. He has successfully lost 15 pounds and is currently on a 1 mg dose of semaglutide. He administers the  medication once weekly at a cost of approximately $ 100 per injection. He plans to increase the dose to 2 mg after a month. He has been advised to switch to tirzepatide if he continues to experience hunger after reaching the maximum dose of semaglutide.    He is currently on a regimen of lisinopril 40 mg and HCTZ 25 mg. He does not require any medication refills at this time, as his previous primary care physician, Kelsey Martinez, had ensured an adequate supply before her [facility]. He anticipates that this supply will last for another 5 to 6 months.    SOCIAL HISTORY  He is retired and has been  for 45 years. He worked at [employer] for 20 years.    ALLERGIES  He is allergic to IBUPROFEN, which makes his eyes swell up.    MEDICATIONS  Current: Lisinopril 40 mg, HCTZ 25 mg, semaglutide 1 mg weekly, Tylenol, Tylenol Extended Release 650 mg, oxycodone 7.5/325 mg (half tablet)  Past: Z-Sriram      Past Medical History:   Past Medical History:   Diagnosis Date    Colon polyps     Hypertension     Sleep apnea      Past Surgical History:  Past Surgical History:   Procedure Laterality Date    COLONOSCOPY  10/31/2013    2 polyps removed    COLONOSCOPY N/A 3/8/2017    Procedure: COLONOSCOPY WITH ANESTHESIA;  Surgeon: Faustino Rendon DO;  Location: [facility] ENDOSCOPY;  Service:      Social History:  reports that he has never smoked. He has never been exposed to tobacco smoke. He has never used smokeless tobacco. He reports current alcohol use. He reports that he does not use drugs.    Family History: He was adopted. Family history is unknown by patient.      Allergies:  Allergies   Allergen Reactions    Aspirin Swelling    Nsaids Hives and Itching    Ciprofloxacin Rash     Medications:  Prior to Admission medications    Medication Sig Start Date End Date Taking? Authorizing Provider   hydrochlorothiazide (HYDRODIURIL) 25 MG tablet  1/6/17  Yes Provider, MD Babak   lisinopril (PRINIVIL,ZESTRIL) 40  "MG tablet  2/16/17  Yes Babak Rivero MD   cyclobenzaprine (FLEXERIL) 10 MG tablet Take 1 tablet by mouth 3 (Three) Times a Day As Needed for Muscle Spasms. 1/13/25   Nabor Richardson MD   methylPREDNISolone (MEDROL) 4 MG dose pack Take as directed on package instructions. 1/13/25   Nabor Richardson MD   Omega-3 Fatty Acids (FISH OIL) 1000 MG capsule capsule Take 1,000 mg by mouth Daily With Breakfast.  Patient not taking: Reported on 1/13/2025    Babak Rivero MD   oxyCODONE (Roxicodone) 5 MG immediate release tablet Take 1 tablet by mouth Every 4 (Four) Hours As Needed for Moderate Pain. 1/13/25   Nabor Richardson MD   Semaglutide-Weight Management 1 MG/0.5ML solution auto-injector Inject 0.5 mL under the skin into the appropriate area as directed 1 (One) Time Per Week.    Babak Rivero MD   Sod Picosulfate-Mag Ox-Cit Acd (PREPOPIK) 10-3.5-12 MG-GM-GM pack Take as directed  Patient not taking: Reported on 1/13/2025 2/21/17   Ned Morin, SUZE           Review of systems   negative unless otherwise specified above in HPI    Objective     Vital Signs: /88 (BP Location: Right arm, Patient Position: Sitting, Cuff Size: Large Adult)   Pulse 76   Temp 97.7 °F (36.5 °C) (Temporal)   Ht 170.2 cm (67.01\")   Wt (!) 137 kg (301 lb)   SpO2 98%   BMI 47.13 kg/m²     Physical Exam  Constitutional:       General: He is not in acute distress.     Appearance: He is obese. He is not ill-appearing.   HENT:      Head: Normocephalic and atraumatic.   Eyes:      Pupils: Pupils are equal, round, and reactive to light.   Cardiovascular:      Rate and Rhythm: Normal rate and regular rhythm.      Pulses: Normal pulses.      Heart sounds: Normal heart sounds.   Pulmonary:      Effort: Pulmonary effort is normal.      Breath sounds: Normal breath sounds.   Abdominal:      General: Abdomen is flat. Bowel sounds are normal.      Palpations: Abdomen is soft.   Musculoskeletal:         " General: Normal range of motion.      Cervical back: Normal range of motion and neck supple.   Skin:     General: Skin is warm and dry.      Capillary Refill: Capillary refill takes less than 2 seconds.   Neurological:      General: No focal deficit present.      Mental Status: He is alert.       Physical Exam               Results Reviewed:  Glucose   Date Value Ref Range Status   06/07/2024 95 74 - 109 mg/dL Final     BUN   Date Value Ref Range Status   06/07/2024 16 8 - 23 mg/dL Final     Creatinine   Date Value Ref Range Status   06/07/2024 1.0 0.5 - 1.2 mg/dL Final     Sodium   Date Value Ref Range Status   06/07/2024 143 136 - 145 mmol/L Final     Potassium   Date Value Ref Range Status   06/07/2024 4.4 3.5 - 5.0 mmol/L Final     Chloride   Date Value Ref Range Status   06/07/2024 101 98 - 111 mmol/L Final     CO2   Date Value Ref Range Status   06/07/2024 32 (H) 22 - 29 mmol/L Final     Calcium   Date Value Ref Range Status   06/07/2024 9.3 8.8 - 10.2 mg/dL Final     ALT (SGPT)   Date Value Ref Range Status   06/07/2024 16 5 - 41 U/L Final     AST (SGOT)   Date Value Ref Range Status   06/07/2024 13 5 - 40 U/L Final     WBC   Date Value Ref Range Status   06/07/2024 6.6 4.8 - 10.8 K/uL Final     Hematocrit   Date Value Ref Range Status   06/07/2024 46.9 42.0 - 52.0 % Final     Platelets   Date Value Ref Range Status   06/07/2024 254 130 - 400 K/uL Final     Triglycerides   Date Value Ref Range Status   01/20/2023 107 0 - 149 mg/dL Final   06/17/2019 132 0 - 149 mg/dL Final     HDL Cholesterol   Date Value Ref Range Status   01/20/2023 55 55 - 121 mg/dL Final     Comment:     VALUES>60 MG/DL ARE ASSOCIATED WITH A DECREASED RISK OF  ATHEROSCLEROTIC CARDIOVASCULAR DISEASE     LDL Cholesterol    Date Value Ref Range Status   01/20/2023 116 <100 mg/dL Final     Comment:     <100 MG/DL=OPITIMAL    100-129 MG/DL=DESIRABLE    130-159 MG/DL BORDERLINE=INCREASED RISK OF ATHEROSCLEROTIC  CARDIOVASCULAR DISEASE    > OR =  160 MG/DL=ASSOCIATED WITH AN INCREASE RISK OF  ATHEROSCLEROTIC CARDIOVASCULAR DISEASE     The following data was reviewed by: Nabor Richardson MD on 01/13/2025:  CBC          3/18/2024    10:18 6/7/2024    06:53   CBC   WBC 14.4     6.6       RBC 5.64     5.48       Hemoglobin 14.8     14.8       Hematocrit 46.9     46.9       MCV 83.2     85.6       MCH 26.2     27.0       MCHC 31.6     31.6       RDW 13.8     13.6       Platelets 281     254          Details          This result is from an external source.               Electrolytes          6/7/2024    06:53   Electrolytes   Sodium 143       Potassium 4.4       Chloride 101       Calcium 9.3          Details          This result is from an external source.                 Results          Assessment / Plan     Assessment/Plan:   Diagnosis Plan   1. Primary hypertension        2. Acute right flank pain  cyclobenzaprine (FLEXERIL) 10 MG tablet    XR Spine Lumbar AP & Lateral    methylPREDNISolone (MEDROL) 4 MG dose pack    XR Hip With or Without Pelvis 2 - 3 View Right    oxyCODONE (Roxicodone) 5 MG immediate release tablet      3. Class 3 severe obesity due to excess calories with serious comorbidity and body mass index (BMI) of 45.0 to 49.9 in adult        Xu has no health insurance and is unable to take nsaids, and tylenol is ineffective. He will rto for a welcome to medicare visit later this year.Morelia left follow up for today's visit open ended.    Assessment & Plan  1. Pain in the right leg.  The symptoms suggest a possible diagnosis of sciatica, but hip pain can not be ruled out. Prescribed Flexeril 10 mg, to be taken three times daily, and a steroid dose pack. Also prescribed oxycodone 5 mg, to be taken every 4 hours for a duration of 3 days. Advised to apply heat to the affected area. Ordered x-rays of the lumbar spine and right hip.    2. Weight management.  Advised to increase the semaglutide dosage to 2 mg after a month. If tirzepatide is  available, it is recommended to switch to this medication and gradually increase the dosage to 15 mg.    3. Medication management.  Currently taking lisinopril 40 mg and HCTZ 25 mg. No refills are needed at this time as he has a sufficient supply for the next 5-6 months.    Follow-up  The patient will follow up in 5 to 6 months for an annual checkup.      Return if symptoms worsen or fail to improve. unless patient needs to be seen sooner or acute issues arise.      I have discussed the patient results/orders and and plan/recommendation with them at today's visit.      Signed by:    Dr. Nabor Richardson Date: 01/13/25    EMR Dictation/Transcription disclaimer:   Some of this note may be an electronic transcription/translation of spoken language to printed text. The electronic translation of spoken language may permit erroneous, or at times, nonsensical words or phrases to be inadvertently transcribed; Although I have reviewed the note for such errors, some may still exist.      Patient or patient representative verbalized consent for the use of Ambient Listening during the visit with  Nabor Richardson MD for chart documentation. 1/13/2025  11:22 CST

## 2025-01-17 ENCOUNTER — PATIENT ROUNDING (BHMG ONLY) (OUTPATIENT)
Dept: FAMILY MEDICINE CLINIC | Facility: CLINIC | Age: 65
End: 2025-01-17
Payer: MEDICAID

## 2025-01-17 NOTE — PROGRESS NOTES
January 17, 2025    Hello, may I speak with Xu Meier?    My name is Carol      I am  with [facility]  [address] [facility] [address] [phone].    Before we get started may I verify your date of birth? 1960    I am calling to officially welcome you to our practice and ask about your recent visit. Is this a good time to talk? yes    Tell me about your visit with us. What things went well?  I liked Dr Richardson and his staff       We're always looking for ways to make our patients' experiences even better. Do you have recommendations on ways we may improve?  no    Overall were you satisfied with your first visit to our practice? yes       I appreciate you taking the time to speak with me today. Is there anything else I can do for you? no      Thank you, and have a great day.

## 2025-08-13 ENCOUNTER — TELEPHONE (OUTPATIENT)
Dept: INTERNAL MEDICINE | Facility: CLINIC | Age: 65
End: 2025-08-13

## 2025-08-13 ENCOUNTER — OFFICE VISIT (OUTPATIENT)
Dept: INTERNAL MEDICINE | Facility: CLINIC | Age: 65
End: 2025-08-13
Payer: MEDICARE

## 2025-08-13 ENCOUNTER — LAB (OUTPATIENT)
Dept: LAB | Facility: HOSPITAL | Age: 65
End: 2025-08-13
Payer: MEDICARE

## 2025-08-13 VITALS
RESPIRATION RATE: 16 BRPM | HEART RATE: 76 BPM | DIASTOLIC BLOOD PRESSURE: 83 MMHG | HEIGHT: 67 IN | SYSTOLIC BLOOD PRESSURE: 138 MMHG | BODY MASS INDEX: 44.76 KG/M2 | WEIGHT: 285.2 LBS | OXYGEN SATURATION: 96 %

## 2025-08-13 DIAGNOSIS — I10 ESSENTIAL HYPERTENSION: Primary | ICD-10-CM

## 2025-08-13 DIAGNOSIS — Z12.5 PROSTATE CANCER SCREENING: ICD-10-CM

## 2025-08-13 DIAGNOSIS — M15.0 PRIMARY OSTEOARTHRITIS INVOLVING MULTIPLE JOINTS: ICD-10-CM

## 2025-08-13 DIAGNOSIS — G47.33 OSA (OBSTRUCTIVE SLEEP APNEA): ICD-10-CM

## 2025-08-13 DIAGNOSIS — I10 ESSENTIAL HYPERTENSION: ICD-10-CM

## 2025-08-13 DIAGNOSIS — R53.83 FATIGUE, UNSPECIFIED TYPE: ICD-10-CM

## 2025-08-13 DIAGNOSIS — R73.9 HYPERGLYCEMIA: ICD-10-CM

## 2025-08-13 DIAGNOSIS — E66.813 CLASS 3 SEVERE OBESITY DUE TO EXCESS CALORIES WITHOUT SERIOUS COMORBIDITY WITH BODY MASS INDEX (BMI) OF 40.0 TO 44.9 IN ADULT: ICD-10-CM

## 2025-08-13 PROBLEM — R10.9 ACUTE RIGHT FLANK PAIN: Status: RESOLVED | Noted: 2025-01-13 | Resolved: 2025-08-13

## 2025-08-13 LAB
ALBUMIN SERPL-MCNC: 4.5 G/DL (ref 3.5–5.2)
ALBUMIN/GLOB SERPL: 1.5 G/DL
ALP SERPL-CCNC: 63 U/L (ref 39–117)
ALT SERPL W P-5'-P-CCNC: 17 U/L (ref 1–41)
ANION GAP SERPL CALCULATED.3IONS-SCNC: 10 MMOL/L (ref 5–15)
AST SERPL-CCNC: 14 U/L (ref 1–40)
BACTERIA UR QL AUTO: NORMAL /HPF
BASOPHILS # BLD AUTO: 0.05 10*3/MM3 (ref 0–0.2)
BASOPHILS NFR BLD AUTO: 0.8 % (ref 0–1.5)
BILIRUB SERPL-MCNC: 0.5 MG/DL (ref 0–1.2)
BILIRUB UR QL STRIP: NEGATIVE
BUN SERPL-MCNC: 23.4 MG/DL (ref 8–23)
BUN/CREAT SERPL: 22.7 (ref 7–25)
CALCIUM SPEC-SCNC: 9.7 MG/DL (ref 8.6–10.5)
CHLORIDE SERPL-SCNC: 99 MMOL/L (ref 98–107)
CHOLEST SERPL-MCNC: 215 MG/DL (ref 0–200)
CLARITY UR: CLEAR
CO2 SERPL-SCNC: 30 MMOL/L (ref 22–29)
COLOR UR: YELLOW
CREAT SERPL-MCNC: 1.03 MG/DL (ref 0.76–1.27)
DEPRECATED RDW RBC AUTO: 42.5 FL (ref 37–54)
EGFRCR SERPLBLD CKD-EPI 2021: 80.6 ML/MIN/1.73
EOSINOPHIL # BLD AUTO: 0.08 10*3/MM3 (ref 0–0.4)
EOSINOPHIL NFR BLD AUTO: 1.3 % (ref 0.3–6.2)
ERYTHROCYTE [DISTWIDTH] IN BLOOD BY AUTOMATED COUNT: 14.2 % (ref 12.3–15.4)
GLOBULIN UR ELPH-MCNC: 3.1 GM/DL
GLUCOSE SERPL-MCNC: 100 MG/DL (ref 65–99)
GLUCOSE UR STRIP-MCNC: NEGATIVE MG/DL
HBA1C MFR BLD: 5.4 % (ref 4.8–5.6)
HCT VFR BLD AUTO: 48.9 % (ref 37.5–51)
HCV AB SER QL: NORMAL
HDLC SERPL-MCNC: 67 MG/DL (ref 40–60)
HGB BLD-MCNC: 15.3 G/DL (ref 13–17.7)
HGB UR QL STRIP.AUTO: ABNORMAL
HYALINE CASTS UR QL AUTO: NORMAL /LPF
IMM GRANULOCYTES # BLD AUTO: 0.02 10*3/MM3 (ref 0–0.05)
IMM GRANULOCYTES NFR BLD AUTO: 0.3 % (ref 0–0.5)
KETONES UR QL STRIP: NEGATIVE
LDLC SERPL CALC-MCNC: 135 MG/DL (ref 0–100)
LDLC/HDLC SERPL: 1.99 {RATIO}
LEUKOCYTE ESTERASE UR QL STRIP.AUTO: NEGATIVE
LYMPHOCYTES # BLD AUTO: 1.33 10*3/MM3 (ref 0.7–3.1)
LYMPHOCYTES NFR BLD AUTO: 21.2 % (ref 19.6–45.3)
MCH RBC QN AUTO: 25.8 PG (ref 26.6–33)
MCHC RBC AUTO-ENTMCNC: 31.3 G/DL (ref 31.5–35.7)
MCV RBC AUTO: 82.3 FL (ref 79–97)
MONOCYTES # BLD AUTO: 0.46 10*3/MM3 (ref 0.1–0.9)
MONOCYTES NFR BLD AUTO: 7.3 % (ref 5–12)
NEUTROPHILS NFR BLD AUTO: 4.32 10*3/MM3 (ref 1.7–7)
NEUTROPHILS NFR BLD AUTO: 69.1 % (ref 42.7–76)
NITRITE UR QL STRIP: NEGATIVE
NRBC BLD AUTO-RTO: 0 /100 WBC (ref 0–0.2)
PH UR STRIP.AUTO: 5.5 [PH] (ref 5–8)
PLATELET # BLD AUTO: 249 10*3/MM3 (ref 140–450)
PMV BLD AUTO: 9.3 FL (ref 6–12)
POTASSIUM SERPL-SCNC: 4.7 MMOL/L (ref 3.5–5.2)
PROT SERPL-MCNC: 7.6 G/DL (ref 6–8.5)
PROT UR QL STRIP: NEGATIVE
PSA SERPL-MCNC: 1.39 NG/ML (ref 0–4)
RBC # BLD AUTO: 5.94 10*6/MM3 (ref 4.14–5.8)
RBC # UR STRIP: NORMAL /HPF
REF LAB TEST METHOD: NORMAL
SODIUM SERPL-SCNC: 139 MMOL/L (ref 136–145)
SP GR UR STRIP: 1.02 (ref 1–1.03)
SQUAMOUS #/AREA URNS HPF: NORMAL /HPF
TESTOST SERPL-MCNC: 456 NG/DL (ref 193–740)
TRIGL SERPL-MCNC: 75 MG/DL (ref 0–150)
TSH SERPL DL<=0.05 MIU/L-ACNC: 1.56 UIU/ML (ref 0.27–4.2)
UROBILINOGEN UR QL STRIP: ABNORMAL
VLDLC SERPL-MCNC: 13 MG/DL (ref 5–40)
WBC # UR STRIP: NORMAL /HPF
WBC NRBC COR # BLD AUTO: 6.26 10*3/MM3 (ref 3.4–10.8)

## 2025-08-13 PROCEDURE — 84443 ASSAY THYROID STIM HORMONE: CPT

## 2025-08-13 PROCEDURE — 85025 COMPLETE CBC W/AUTO DIFF WBC: CPT

## 2025-08-13 PROCEDURE — G0103 PSA SCREENING: HCPCS

## 2025-08-13 PROCEDURE — 86803 HEPATITIS C AB TEST: CPT

## 2025-08-13 PROCEDURE — 81001 URINALYSIS AUTO W/SCOPE: CPT

## 2025-08-13 PROCEDURE — 36415 COLL VENOUS BLD VENIPUNCTURE: CPT

## 2025-08-13 PROCEDURE — 84403 ASSAY OF TOTAL TESTOSTERONE: CPT | Performed by: INTERNAL MEDICINE

## 2025-08-13 PROCEDURE — 83036 HEMOGLOBIN GLYCOSYLATED A1C: CPT

## 2025-08-13 PROCEDURE — 80061 LIPID PANEL: CPT

## 2025-08-13 PROCEDURE — 80053 COMPREHEN METABOLIC PANEL: CPT

## 2025-08-14 DIAGNOSIS — G47.30 SLEEP APNEA IN ADULT: Primary | ICD-10-CM

## 2025-08-18 DIAGNOSIS — I10 ESSENTIAL HYPERTENSION: Primary | ICD-10-CM

## 2025-08-18 RX ORDER — ATORVASTATIN CALCIUM 20 MG/1
20 TABLET, FILM COATED ORAL DAILY
Qty: 30 TABLET | Refills: 5 | Status: SHIPPED | OUTPATIENT
Start: 2025-08-18

## 2025-08-20 RX ORDER — HYDROCHLOROTHIAZIDE 25 MG/1
25 TABLET ORAL DAILY
Qty: 30 TABLET | Refills: 1 | Status: SHIPPED | OUTPATIENT
Start: 2025-08-20

## (undated) DEVICE — MSK O2 MD CONCENTR A/ LF 7FT 1P/U

## (undated) DEVICE — Device: Brand: DEFENDO AIR/WATER/SUCTION AND BIOPSY VALVE

## (undated) DEVICE — SENSR O2 OXIMAX FNGR A/ 18IN NONSTR

## (undated) DEVICE — TBG SMPL FLTR LINE NASL 02/C02 A/ BX/100

## (undated) DEVICE — THE CHANNEL CLEANING BRUSH IS A NYLON FLEXI BRUSH ATTACHED TO A FLEXIBLE PLASTIC SHEATH DESIGNED TO SAFELY REMOVE DEBRIS FROM FLEXIBLE ENDOSCOPES.

## (undated) DEVICE — ENDOGATOR AUXILIARY WATER JET CONNECTOR: Brand: ENDOGATOR